# Patient Record
Sex: FEMALE | Race: BLACK OR AFRICAN AMERICAN | NOT HISPANIC OR LATINO | Employment: UNEMPLOYED | URBAN - METROPOLITAN AREA
[De-identification: names, ages, dates, MRNs, and addresses within clinical notes are randomized per-mention and may not be internally consistent; named-entity substitution may affect disease eponyms.]

---

## 2023-04-26 ENCOUNTER — TELEPHONE (OUTPATIENT)
Dept: PRIMARY CARE | Facility: CLINIC | Age: 53
End: 2023-04-26
Payer: COMMERCIAL

## 2023-04-26 DIAGNOSIS — R06.2 WHEEZING: Primary | ICD-10-CM

## 2023-04-26 PROBLEM — J40 BRONCHITIS: Status: ACTIVE | Noted: 2023-04-26

## 2023-04-26 PROBLEM — E66.9 OBESITY: Status: ACTIVE | Noted: 2023-04-26

## 2023-04-26 PROBLEM — J45.909 ASTHMA (HHS-HCC): Status: ACTIVE | Noted: 2023-04-26

## 2023-04-26 PROBLEM — J45.901 ASTHMA WITH ACUTE EXACERBATION (HHS-HCC): Status: ACTIVE | Noted: 2023-04-26

## 2023-04-26 PROBLEM — R05.9 COUGH: Status: ACTIVE | Noted: 2023-04-26

## 2023-04-26 PROBLEM — T38.0X5A STEROID-INDUCED MYOPATHY: Status: ACTIVE | Noted: 2023-04-26

## 2023-04-26 PROBLEM — U07.1 DISEASE DUE TO SEVERE ACUTE RESPIRATORY SYNDROME CORONAVIRUS 2 (SARS-COV-2): Status: ACTIVE | Noted: 2023-04-26

## 2023-04-26 PROBLEM — R00.0 TACHYCARDIA: Status: ACTIVE | Noted: 2023-04-26

## 2023-04-26 PROBLEM — J30.1 HAY FEVER: Status: ACTIVE | Noted: 2023-04-26

## 2023-04-26 PROBLEM — R53.83 FATIGUE: Status: ACTIVE | Noted: 2023-04-26

## 2023-04-26 PROBLEM — I10 HYPERTENSION: Status: ACTIVE | Noted: 2023-04-26

## 2023-04-26 PROBLEM — M25.529 ELBOW PAIN: Status: ACTIVE | Noted: 2023-04-26

## 2023-04-26 PROBLEM — R68.89 CUSHINGOID FACIES: Status: ACTIVE | Noted: 2023-04-26

## 2023-04-26 PROBLEM — J44.1 COPD EXACERBATION (MULTI): Status: ACTIVE | Noted: 2023-04-26

## 2023-04-26 PROBLEM — R73.01 IMPAIRED FASTING GLUCOSE: Status: ACTIVE | Noted: 2023-04-26

## 2023-04-26 PROBLEM — J20.9 ACUTE BRONCHITIS: Status: ACTIVE | Noted: 2023-04-26

## 2023-04-26 PROBLEM — K21.9 ESOPHAGEAL REFLUX: Status: ACTIVE | Noted: 2023-04-26

## 2023-04-26 PROBLEM — F41.9 ANXIETY: Status: ACTIVE | Noted: 2023-04-26

## 2023-04-26 PROBLEM — J30.9 ALLERGIC RHINITIS: Status: ACTIVE | Noted: 2023-04-26

## 2023-04-26 PROBLEM — E78.5 HYPERLIPIDEMIA: Status: ACTIVE | Noted: 2023-04-26

## 2023-04-26 PROBLEM — R06.02 SOB (SHORTNESS OF BREATH): Status: ACTIVE | Noted: 2023-04-26

## 2023-04-26 PROBLEM — R25.2 LEG CRAMPS: Status: ACTIVE | Noted: 2023-04-26

## 2023-04-26 PROBLEM — R60.9 EDEMA: Status: ACTIVE | Noted: 2023-04-26

## 2023-04-26 PROBLEM — J44.9 COPD (CHRONIC OBSTRUCTIVE PULMONARY DISEASE) (MULTI): Status: ACTIVE | Noted: 2023-04-26

## 2023-04-26 PROBLEM — N80.9 ENDOMETRIOSIS: Status: ACTIVE | Noted: 2023-04-26

## 2023-04-26 PROBLEM — G47.33 OBSTRUCTIVE SLEEP APNEA: Status: ACTIVE | Noted: 2023-04-26

## 2023-04-26 PROBLEM — E55.9 VITAMIN D DEFICIENCY: Status: ACTIVE | Noted: 2023-04-26

## 2023-04-26 PROBLEM — F43.0 ACUTE REACTION TO STRESS: Status: ACTIVE | Noted: 2023-04-26

## 2023-04-26 PROBLEM — B34.2 CORONAVIRUS INFECTION: Status: ACTIVE | Noted: 2023-04-26

## 2023-04-26 PROBLEM — B94.8 POST-VIRAL DISORDER: Status: ACTIVE | Noted: 2023-04-26

## 2023-04-26 PROBLEM — M54.32 SCIATICA OF LEFT SIDE: Status: ACTIVE | Noted: 2023-04-26

## 2023-04-26 PROBLEM — M75.50 SUBACROMIAL BURSITIS: Status: ACTIVE | Noted: 2023-04-26

## 2023-04-26 PROBLEM — J06.9 UPPER RESPIRATORY INFECTION: Status: ACTIVE | Noted: 2023-04-26

## 2023-04-26 PROBLEM — R87.610 PAP SMEAR ABNORMALITY OF CERVIX WITH ASCUS FAVORING BENIGN: Status: ACTIVE | Noted: 2023-04-26

## 2023-04-26 PROBLEM — I27.20 PULMONARY HYPERTENSION (MULTI): Status: ACTIVE | Noted: 2023-04-26

## 2023-04-26 PROBLEM — J30.2 SEASONAL ALLERGIES: Status: ACTIVE | Noted: 2023-04-26

## 2023-04-26 PROBLEM — E66.01 MORBID OBESITY (MULTI): Status: ACTIVE | Noted: 2023-04-26

## 2023-04-26 PROBLEM — G72.0 STEROID-INDUCED MYOPATHY: Status: ACTIVE | Noted: 2023-04-26

## 2023-04-26 PROBLEM — M25.511 PAIN IN JOINT OF RIGHT SHOULDER: Status: ACTIVE | Noted: 2023-04-26

## 2023-04-26 PROBLEM — R63.2 POLYPHAGIA: Status: ACTIVE | Noted: 2023-04-26

## 2023-04-26 PROBLEM — M70.62 TROCHANTERIC BURSITIS OF LEFT HIP: Status: ACTIVE | Noted: 2023-04-26

## 2023-04-26 PROBLEM — R94.31 ABNORMAL ELECTROCARDIOGRAM: Status: ACTIVE | Noted: 2023-04-26

## 2023-04-26 PROBLEM — R09.82 POST-NASAL DRIP: Status: ACTIVE | Noted: 2023-04-26

## 2023-04-26 PROBLEM — R10.2 FEMALE PELVIC PAIN: Status: ACTIVE | Noted: 2023-04-26

## 2023-04-26 PROBLEM — I50.9 CONGESTIVE HEART FAILURE (CHF) (MULTI): Status: ACTIVE | Noted: 2023-04-26

## 2023-04-26 PROBLEM — L73.2 HIDRADENITIS: Status: ACTIVE | Noted: 2023-04-26

## 2023-04-26 PROBLEM — R07.9 CHEST PAIN: Status: ACTIVE | Noted: 2023-04-26

## 2023-04-26 PROBLEM — R68.89 COLD INTOLERANCE: Status: ACTIVE | Noted: 2023-04-26

## 2023-04-26 PROBLEM — E11.9 DIABETES MELLITUS, TYPE 2 (MULTI): Status: ACTIVE | Noted: 2023-04-26

## 2023-04-26 PROBLEM — R05.3 CHRONIC COUGH: Status: ACTIVE | Noted: 2023-04-26

## 2023-04-26 PROBLEM — M17.12 ARTHRITIS OF KNEE, LEFT: Status: ACTIVE | Noted: 2023-04-26

## 2023-04-26 PROBLEM — M16.12 ARTHRITIS OF LEFT HIP: Status: ACTIVE | Noted: 2023-04-26

## 2023-04-26 RX ORDER — ALBUTEROL SULFATE 90 UG/1
AEROSOL, METERED RESPIRATORY (INHALATION)
Qty: 18 G | Refills: 0 | Status: SHIPPED | OUTPATIENT
Start: 2023-04-26 | End: 2023-07-10 | Stop reason: SDUPTHER

## 2023-04-26 RX ORDER — ALBUTEROL SULFATE 90 UG/1
AEROSOL, METERED RESPIRATORY (INHALATION)
COMMUNITY
Start: 2018-02-28 | End: 2023-04-26 | Stop reason: SDUPTHER

## 2023-04-26 NOTE — TELEPHONE ENCOUNTER
Pt called in and stated that she has an established protocol for her asthma and is all out of her meds.        RX: Albuterol Inhaler    RX: Prednisone 10 mg      RX: Medrol pack      RX: Z-PACK            [facility]            CAN YOU PLEASE PEND AND SEND

## 2023-05-04 DIAGNOSIS — E11.9 TYPE 2 DIABETES MELLITUS WITHOUT COMPLICATION, UNSPECIFIED WHETHER LONG TERM INSULIN USE (MULTI): ICD-10-CM

## 2023-05-04 RX ORDER — TIRZEPATIDE 12.5 MG/.5ML
INJECTION, SOLUTION SUBCUTANEOUS
COMMUNITY
Start: 2022-11-07 | End: 2023-05-04 | Stop reason: SDUPTHER

## 2023-05-05 RX ORDER — TIRZEPATIDE 12.5 MG/.5ML
12.5 INJECTION, SOLUTION SUBCUTANEOUS
Qty: 2 ML | Refills: 0 | Status: SHIPPED | OUTPATIENT
Start: 2023-05-05 | End: 2023-06-08 | Stop reason: SDUPTHER

## 2023-05-17 DIAGNOSIS — J40 BRONCHITIS: ICD-10-CM

## 2023-05-17 RX ORDER — MONTELUKAST SODIUM 10 MG/1
1 TABLET ORAL NIGHTLY
COMMUNITY
Start: 2018-09-25 | End: 2023-05-17 | Stop reason: SDUPTHER

## 2023-05-18 RX ORDER — MONTELUKAST SODIUM 10 MG/1
10 TABLET ORAL NIGHTLY
Qty: 30 TABLET | Refills: 0 | Status: SHIPPED | OUTPATIENT
Start: 2023-05-18 | End: 2023-06-12

## 2023-06-06 ENCOUNTER — TELEPHONE (OUTPATIENT)
Dept: PRIMARY CARE | Facility: CLINIC | Age: 53
End: 2023-06-06
Payer: COMMERCIAL

## 2023-06-06 NOTE — TELEPHONE ENCOUNTER
PT called in and would like to est care with Dr. Mcknight since Dr. Martinez is not taking new patients. PT needs a RF until she can get an APPT with new PCP      RX: Tirzepatide 12.5 mg/0.5 ml        [facility]                      CAN YOU PEND AND SEND AND SEE IF [facility] CAN DO TEMPORARY SUPPLY

## 2023-06-08 DIAGNOSIS — E11.9 TYPE 2 DIABETES MELLITUS WITHOUT COMPLICATION, UNSPECIFIED WHETHER LONG TERM INSULIN USE (MULTI): ICD-10-CM

## 2023-06-08 RX ORDER — TIRZEPATIDE 12.5 MG/.5ML
12.5 INJECTION, SOLUTION SUBCUTANEOUS
Qty: 2 ML | Refills: 1 | Status: SHIPPED | OUTPATIENT
Start: 2023-06-08 | End: 2023-07-10 | Stop reason: SDUPTHER

## 2023-06-09 ENCOUNTER — TELEPHONE (OUTPATIENT)
Dept: PRIMARY CARE | Facility: CLINIC | Age: 53
End: 2023-06-09
Payer: COMMERCIAL

## 2023-06-09 DIAGNOSIS — I10 HYPERTENSION, UNSPECIFIED TYPE: ICD-10-CM

## 2023-06-09 NOTE — TELEPHONE ENCOUNTER
Patient called and stated the eLtty RMB sent to pharmacy yesterday is out of stock she needs a new dosage sent to [facility] pharmacy of either the higher dose or the lower, which she said is 10MG    Please call and advise patient when sent to pharmacy she would like it done asap  [phone]

## 2023-06-10 DIAGNOSIS — J40 BRONCHITIS: ICD-10-CM

## 2023-06-10 DIAGNOSIS — E11.9 TYPE 2 DIABETES MELLITUS WITHOUT COMPLICATION, UNSPECIFIED WHETHER LONG TERM INSULIN USE (MULTI): ICD-10-CM

## 2023-06-12 DIAGNOSIS — E11.69 TYPE 2 DIABETES MELLITUS WITH OTHER SPECIFIED COMPLICATION, UNSPECIFIED WHETHER LONG TERM INSULIN USE (MULTI): ICD-10-CM

## 2023-06-12 RX ORDER — FUROSEMIDE 20 MG/1
20 TABLET ORAL 2 TIMES DAILY
Qty: 60 TABLET | Refills: 0 | Status: CANCELLED | OUTPATIENT
Start: 2023-06-12

## 2023-06-12 RX ORDER — MONTELUKAST SODIUM 10 MG/1
10 TABLET ORAL NIGHTLY
Qty: 30 TABLET | Refills: 0 | Status: SHIPPED | OUTPATIENT
Start: 2023-06-12 | End: 2023-07-10 | Stop reason: SDUPTHER

## 2023-06-12 RX ORDER — POTASSIUM CHLORIDE 1500 MG/1
20 TABLET, EXTENDED RELEASE ORAL DAILY
Qty: 30 TABLET | Refills: 0 | Status: SHIPPED | OUTPATIENT
Start: 2023-06-12 | End: 2023-08-23 | Stop reason: SDUPTHER

## 2023-06-12 RX ORDER — FUROSEMIDE 20 MG/1
20 TABLET ORAL 2 TIMES DAILY
Qty: 60 TABLET | Refills: 0 | Status: SHIPPED | OUTPATIENT
Start: 2023-06-12

## 2023-06-12 RX ORDER — TIRZEPATIDE 7.5 MG/.5ML
7.5 INJECTION, SOLUTION SUBCUTANEOUS
Qty: 4 ML | Refills: 2 | Status: SHIPPED | OUTPATIENT
Start: 2023-06-12 | End: 2023-06-13 | Stop reason: DRUGHIGH

## 2023-06-12 RX ORDER — POTASSIUM CHLORIDE 1500 MG/1
20 TABLET, EXTENDED RELEASE ORAL DAILY
COMMUNITY
Start: 2014-07-05 | End: 2023-06-12 | Stop reason: SDUPTHER

## 2023-06-12 RX ORDER — FUROSEMIDE 20 MG/1
20 TABLET ORAL 2 TIMES DAILY
COMMUNITY
End: 2023-06-12 | Stop reason: SDUPTHER

## 2023-06-12 RX ORDER — POTASSIUM CHLORIDE 1500 MG/1
20 TABLET, EXTENDED RELEASE ORAL DAILY
Qty: 30 TABLET | Refills: 0 | Status: CANCELLED | OUTPATIENT
Start: 2023-06-12

## 2023-06-12 NOTE — TELEPHONE ENCOUNTER
Called patient and she informed me that for the mounjaro she is taking 12.5 and since the pharmacy is out of the 12.5 she would like to take the 10. Patient also stated that she would like the refills sent over to [facility] pharmacy.      Patient was identified with full name and date of birth.       Lyubov Upton MA

## 2023-06-13 RX ORDER — TIRZEPATIDE 15 MG/.5ML
15 INJECTION, SOLUTION SUBCUTANEOUS
Qty: 4 ML | Refills: 0 | Status: SHIPPED | OUTPATIENT
Start: 2023-06-13 | End: 2023-07-13

## 2023-06-13 NOTE — TELEPHONE ENCOUNTER
PT called in and stated that the wrong dosage for her RX Mounjaro was sent to pharmacy by Dr. Ackerman. PT states that the dosage should either be a 10 or 15 mg NOT 7.5 mg that would be going down for the PT. She stated she is going into 72 hrs without medication can please send in correct dose. PT states she has been trying to get corrected since last week

## 2023-06-21 ENCOUNTER — TELEPHONE (OUTPATIENT)
Dept: PRIMARY CARE | Facility: CLINIC | Age: 53
End: 2023-06-21

## 2023-06-23 ENCOUNTER — TELEPHONE (OUTPATIENT)
Dept: PRIMARY CARE | Facility: CLINIC | Age: 53
End: 2023-06-23
Payer: COMMERCIAL

## 2023-07-10 ENCOUNTER — OFFICE VISIT (OUTPATIENT)
Dept: PRIMARY CARE | Facility: CLINIC | Age: 53
End: 2023-07-10
Payer: COMMERCIAL

## 2023-07-10 ENCOUNTER — LAB (OUTPATIENT)
Dept: LAB | Facility: LAB | Age: 53
End: 2023-07-10
Payer: COMMERCIAL

## 2023-07-10 VITALS
SYSTOLIC BLOOD PRESSURE: 118 MMHG | OXYGEN SATURATION: 96 % | HEIGHT: 66 IN | DIASTOLIC BLOOD PRESSURE: 74 MMHG | WEIGHT: 288.8 LBS | RESPIRATION RATE: 20 BRPM | BODY MASS INDEX: 46.41 KG/M2 | HEART RATE: 94 BPM

## 2023-07-10 DIAGNOSIS — I10 PRIMARY HYPERTENSION: Primary | ICD-10-CM

## 2023-07-10 DIAGNOSIS — E78.2 MIXED HYPERLIPIDEMIA: ICD-10-CM

## 2023-07-10 DIAGNOSIS — Z12.31 VISIT FOR SCREENING MAMMOGRAM: ICD-10-CM

## 2023-07-10 DIAGNOSIS — Z71.82 EXERCISE COUNSELING: ICD-10-CM

## 2023-07-10 DIAGNOSIS — E11.9 TYPE 2 DIABETES MELLITUS WITHOUT COMPLICATION, WITHOUT LONG-TERM CURRENT USE OF INSULIN (MULTI): ICD-10-CM

## 2023-07-10 DIAGNOSIS — E66.01 MORBID OBESITY (MULTI): ICD-10-CM

## 2023-07-10 DIAGNOSIS — I10 PRIMARY HYPERTENSION: ICD-10-CM

## 2023-07-10 DIAGNOSIS — E11.9 TYPE 2 DIABETES MELLITUS WITHOUT COMPLICATION, UNSPECIFIED WHETHER LONG TERM INSULIN USE (MULTI): ICD-10-CM

## 2023-07-10 DIAGNOSIS — J40 BRONCHITIS: ICD-10-CM

## 2023-07-10 DIAGNOSIS — J45.20 MILD INTERMITTENT ASTHMA WITHOUT COMPLICATION (HHS-HCC): ICD-10-CM

## 2023-07-10 DIAGNOSIS — R06.2 WHEEZING: ICD-10-CM

## 2023-07-10 PROBLEM — J44.1 COPD EXACERBATION (MULTI): Status: RESOLVED | Noted: 2023-04-26 | Resolved: 2023-07-10

## 2023-07-10 PROBLEM — J44.9 COPD (CHRONIC OBSTRUCTIVE PULMONARY DISEASE) (MULTI): Status: RESOLVED | Noted: 2023-04-26 | Resolved: 2023-07-10

## 2023-07-10 PROBLEM — I50.9 CONGESTIVE HEART FAILURE (CHF) (MULTI): Status: RESOLVED | Noted: 2023-04-26 | Resolved: 2023-07-10

## 2023-07-10 PROBLEM — I27.20 PULMONARY HYPERTENSION (MULTI): Status: RESOLVED | Noted: 2023-04-26 | Resolved: 2023-07-10

## 2023-07-10 LAB
ALBUMIN (MG/L) IN URINE: 10.3 MG/L
ALBUMIN/CREATININE (UG/MG) IN URINE: 6 UG/MG CRT (ref 0–30)
ANION GAP IN SER/PLAS: 13 MMOL/L (ref 10–20)
CALCIUM (MG/DL) IN SER/PLAS: 9.2 MG/DL (ref 8.6–10.3)
CARBON DIOXIDE, TOTAL (MMOL/L) IN SER/PLAS: 24 MMOL/L (ref 21–32)
CHLORIDE (MMOL/L) IN SER/PLAS: 106 MMOL/L (ref 98–107)
CHOLESTEROL (MG/DL) IN SER/PLAS: 212 MG/DL (ref 0–199)
CHOLESTEROL IN HDL (MG/DL) IN SER/PLAS: 52.8 MG/DL
CHOLESTEROL/HDL RATIO: 4
CREATININE (MG/DL) IN SER/PLAS: 0.53 MG/DL (ref 0.5–1.05)
CREATININE (MG/DL) IN URINE: 173 MG/DL (ref 20–320)
ESTIMATED AVERAGE GLUCOSE FOR HBA1C: 111 MG/DL
GFR FEMALE: >90 ML/MIN/1.73M2
GLUCOSE (MG/DL) IN SER/PLAS: 78 MG/DL (ref 74–99)
HEMOGLOBIN A1C/HEMOGLOBIN TOTAL IN BLOOD: 5.5 %
LDL: 140 MG/DL (ref 0–99)
POTASSIUM (MMOL/L) IN SER/PLAS: 4.3 MMOL/L (ref 3.5–5.3)
SODIUM (MMOL/L) IN SER/PLAS: 139 MMOL/L (ref 136–145)
TRIGLYCERIDE (MG/DL) IN SER/PLAS: 97 MG/DL (ref 0–149)
UREA NITROGEN (MG/DL) IN SER/PLAS: 13 MG/DL (ref 6–23)
VLDL: 19 MG/DL (ref 0–40)

## 2023-07-10 PROCEDURE — 82043 UR ALBUMIN QUANTITATIVE: CPT

## 2023-07-10 PROCEDURE — 3074F SYST BP LT 130 MM HG: CPT | Performed by: STUDENT IN AN ORGANIZED HEALTH CARE EDUCATION/TRAINING PROGRAM

## 2023-07-10 PROCEDURE — 3008F BODY MASS INDEX DOCD: CPT | Performed by: STUDENT IN AN ORGANIZED HEALTH CARE EDUCATION/TRAINING PROGRAM

## 2023-07-10 PROCEDURE — 3078F DIAST BP <80 MM HG: CPT | Performed by: STUDENT IN AN ORGANIZED HEALTH CARE EDUCATION/TRAINING PROGRAM

## 2023-07-10 PROCEDURE — 83036 HEMOGLOBIN GLYCOSYLATED A1C: CPT

## 2023-07-10 PROCEDURE — 36415 COLL VENOUS BLD VENIPUNCTURE: CPT

## 2023-07-10 PROCEDURE — 99214 OFFICE O/P EST MOD 30 MIN: CPT | Performed by: STUDENT IN AN ORGANIZED HEALTH CARE EDUCATION/TRAINING PROGRAM

## 2023-07-10 PROCEDURE — 82570 ASSAY OF URINE CREATININE: CPT

## 2023-07-10 PROCEDURE — 80048 BASIC METABOLIC PNL TOTAL CA: CPT

## 2023-07-10 PROCEDURE — 80061 LIPID PANEL: CPT

## 2023-07-10 PROCEDURE — 1036F TOBACCO NON-USER: CPT | Performed by: STUDENT IN AN ORGANIZED HEALTH CARE EDUCATION/TRAINING PROGRAM

## 2023-07-10 PROCEDURE — 4010F ACE/ARB THERAPY RXD/TAKEN: CPT | Performed by: STUDENT IN AN ORGANIZED HEALTH CARE EDUCATION/TRAINING PROGRAM

## 2023-07-10 RX ORDER — MONTELUKAST SODIUM 10 MG/1
10 TABLET ORAL NIGHTLY
Qty: 90 TABLET | Refills: 3 | Status: SHIPPED | OUTPATIENT
Start: 2023-07-10

## 2023-07-10 RX ORDER — LOSARTAN POTASSIUM 100 MG/1
100 TABLET ORAL DAILY
COMMUNITY
End: 2023-10-16 | Stop reason: SDUPTHER

## 2023-07-10 RX ORDER — ALBUTEROL SULFATE 90 UG/1
AEROSOL, METERED RESPIRATORY (INHALATION)
Qty: 18 G | Refills: 3 | Status: SHIPPED | OUTPATIENT
Start: 2023-07-10

## 2023-07-10 RX ORDER — TIRZEPATIDE 12.5 MG/.5ML
12.5 INJECTION, SOLUTION SUBCUTANEOUS
Qty: 6 ML | Refills: 1 | Status: SHIPPED | OUTPATIENT
Start: 2023-07-10 | End: 2023-09-18 | Stop reason: SDUPTHER

## 2023-07-10 NOTE — PROGRESS NOTES
"Subjective   Patient ID: Smitha Frank is a 53 y.o. female who presents for Back Pain (Lower back pain. Having back spasms.).        HPI    Est care   Pt's PMH, PSH, SH, FH , meds and allergies was obtained / reviewed and updated .     HTN   At goal      Prediabetes / Diabetes     Asthma +/- COPD   Former smoker , quit in 2010     Pulm HTN  Est with Pulm     HPL   Not on statin     Obesity   On Mounjaro  since Dec   Lost 16 lbs since Dec   No regular exercise regiment other than movement with ADLs        Visit Vitals  /74   Pulse 94   Resp 20   Ht 1.664 m (5' 5.5\")   Wt 131 kg (288 lb 12.8 oz)   SpO2 96%   BMI 47.33 kg/m²   Smoking Status Former   BSA 2.46 m²      No LMP recorded.     Review of Systems    Constitutional : No feeling poorly / fevers/ chills / night sweats/ fatigue   Cardiovascular : No CP /Palpitations/ lower extremity edema / syncope   Respiratory : No Cough /SIDHU/Dyspnea at rest   Gastrointestinal : No abd pain / N/V  No bloody stools/ melena / constipation  Endo : No polyuria/polydipsia/ muscle weakness / sluggishness   CNS: No confusion / HA/ tingling/ numbness/ weakness of extremities  Psychiatric: No anxiety/ depression/ SI/HI    All other systems have been reviewed and are negative for complaint       Physical Exam    Constitutional : Vitals reviewed. Alert and in no distress  Cardiovascular : RRR, Normal S1, S2, No pericardial rub/ gallop, no peripheral edema   Pulmonary: No respiratory distress, CTAB   MSK : Normal gait and station , strength and tone   Skin: Normal skin color and pigmentation, normal skin turgor and no rash   Neurologic : CNs 2-12 grossly intact , no obvious FNDs  Psych : A,Ox3, normal mood and affect      Assessment/Plan   Diagnoses and all orders for this visit:  Primary hypertension  -     Basic Metabolic Panel; Future  Type 2 diabetes mellitus without complication, without long-term current use of insulin (CMS/[facility])  -     Albumin , Urine Random; Future  -     " Hemoglobin A1C; Future  Mixed hyperlipidemia  -     Lipid Panel; Future  Morbid obesity (CMS/[facility])  -     Referral to Nutrition Services; Future  Visit for screening mammogram  -     BI mammo bilateral screening tomosynthesis; Future  Body mass index (BMI) of 45.0 to 49.9 in adult (CMS/[facility])  -     Referral to Nutrition Services; Future  -     tirzepatide (Mounjaro) 12.5 mg/0.5 mL pen injector; Inject 12.5 mg under the skin every 7 days.  Type 2 diabetes mellitus without complication, unspecified whether long term insulin use (CMS/[facility])  -     tirzepatide (Mounjaro) 12.5 mg/0.5 mL pen injector; Inject 12.5 mg under the skin every 7 days.  Bronchitis  -     montelukast (Singulair) 10 mg tablet; Take 1 tablet (10 mg) by mouth once daily at bedtime.    53-year-old female with hypertension, obesity, history of diabetes, hyperlipidemia, mild intermittent asthma, former smoker (quit in 2010) presents to establish care.       Conditions addressed and mgmt as noted above.  Pertinent labs, images/ imaging reports , chart review was done .   Age appropriate labs / labs for mgmt of chronic medical conditions ordered, further mgmt pending the results.       I did inform patient that if she experiences asthma exacerbation, I will be glad to see her in the office rather than giving her antibiotics, prednisone burst ahead of her symptoms ( to the pharmacy )    RTO in 3m for CPE

## 2023-07-18 ENCOUNTER — TELEPHONE (OUTPATIENT)
Dept: PRIMARY CARE | Facility: CLINIC | Age: 53
End: 2023-07-18
Payer: COMMERCIAL

## 2023-07-18 DIAGNOSIS — R92.8 ABNORMAL MAMMOGRAM OF RIGHT BREAST: Primary | ICD-10-CM

## 2023-07-18 NOTE — TELEPHONE ENCOUNTER
----- Message from Kirsten Ayers MD sent at 7/18/2023  4:00 PM EDT -----  Right breast needs additional imaging , ordered   [phone] to schedule   No cancer seen in the left breast

## 2023-08-02 ENCOUNTER — TELEPHONE (OUTPATIENT)
Dept: PRIMARY CARE | Facility: CLINIC | Age: 53
End: 2023-08-02
Payer: COMMERCIAL

## 2023-08-02 NOTE — TELEPHONE ENCOUNTER
----- Message from Kirsten Ayers MD sent at 8/2/2023  2:33 PM EDT -----  No worrisome findings were seen in the right breast on rpt imaging

## 2023-08-23 DIAGNOSIS — I10 HYPERTENSION, UNSPECIFIED TYPE: ICD-10-CM

## 2023-08-23 RX ORDER — POTASSIUM CHLORIDE 1500 MG/1
20 TABLET, EXTENDED RELEASE ORAL DAILY
Qty: 90 TABLET | Refills: 3 | Status: SHIPPED | OUTPATIENT
Start: 2023-08-23

## 2023-08-27 RX ORDER — BLOOD SUGAR DIAGNOSTIC
STRIP MISCELLANEOUS
COMMUNITY
Start: 2021-01-21

## 2023-08-27 RX ORDER — PANTOPRAZOLE SODIUM 40 MG/1
40 TABLET, DELAYED RELEASE ORAL
COMMUNITY
Start: 2014-07-25 | End: 2023-10-16

## 2023-08-27 RX ORDER — FLUTICASONE PROPIONATE 50 MCG
4 SPRAY, SUSPENSION (ML) NASAL 3 TIMES DAILY
COMMUNITY
Start: 2017-07-14

## 2023-08-27 RX ORDER — IPRATROPIUM BROMIDE AND ALBUTEROL SULFATE 2.5; .5 MG/3ML; MG/3ML
3 SOLUTION RESPIRATORY (INHALATION) EVERY 6 HOURS
COMMUNITY

## 2023-08-27 RX ORDER — SEMAGLUTIDE 1.34 MG/ML
1 INJECTION, SOLUTION SUBCUTANEOUS
COMMUNITY
Start: 2022-02-03 | End: 2023-09-18 | Stop reason: ALTCHOICE

## 2023-08-27 RX ORDER — CHOLECALCIFEROL (VITAMIN D3) 25 MCG
5000 TABLET ORAL
COMMUNITY
End: 2023-09-18

## 2023-08-27 RX ORDER — FLUTICASONE PROPIONATE 50 MCG
1 SPRAY, SUSPENSION (ML) NASAL DAILY
COMMUNITY
End: 2023-09-18

## 2023-08-27 RX ORDER — METFORMIN HYDROCHLORIDE 500 MG/1
1000 TABLET ORAL
COMMUNITY
Start: 2018-02-16 | End: 2023-09-18 | Stop reason: ALTCHOICE

## 2023-08-27 RX ORDER — DEXTROMETHORPHAN HBR. AND GUAIFENESIN 10; 100 MG/5ML; MG/5ML
5 SOLUTION ORAL EVERY 4 HOURS PRN
COMMUNITY
Start: 2021-01-18 | End: 2023-09-18 | Stop reason: ALTCHOICE

## 2023-08-27 RX ORDER — TIRZEPATIDE 5 MG/.5ML
INJECTION, SOLUTION SUBCUTANEOUS
COMMUNITY
Start: 2022-11-07 | End: 2023-09-18

## 2023-08-27 RX ORDER — MAGNESIUM GLUCONATE 27.5 (500)
200 TABLET ORAL 2 TIMES DAILY
COMMUNITY

## 2023-08-27 RX ORDER — FLUTICASONE FUROATE AND VILANTEROL 200; 25 UG/1; UG/1
1 POWDER RESPIRATORY (INHALATION) DAILY
COMMUNITY
Start: 2018-02-28 | End: 2023-10-16 | Stop reason: SDUPTHER

## 2023-08-27 RX ORDER — CALCIUM CARBONATE 300MG(750)
1 TABLET,CHEWABLE ORAL DAILY
COMMUNITY
End: 2023-10-16

## 2023-08-27 RX ORDER — GABAPENTIN 100 MG/1
1-2 CAPSULE ORAL 3 TIMES DAILY PRN
COMMUNITY
Start: 2020-01-29

## 2023-08-27 RX ORDER — THEOPHYLLINE 200 MG/1
200 TABLET, EXTENDED RELEASE ORAL 3 TIMES DAILY
COMMUNITY
End: 2023-09-18 | Stop reason: ALTCHOICE

## 2023-08-27 RX ORDER — ASPIRIN 325 MG
50000 TABLET, DELAYED RELEASE (ENTERIC COATED) ORAL
COMMUNITY

## 2023-08-27 RX ORDER — FLUTICASONE PROPIONATE AND SALMETEROL 500; 50 UG/1; UG/1
1 POWDER RESPIRATORY (INHALATION) 2 TIMES DAILY
COMMUNITY
End: 2023-09-18

## 2023-08-27 RX ORDER — FLUTICASONE PROPIONATE 220 UG/1
2 AEROSOL, METERED RESPIRATORY (INHALATION) 2 TIMES DAILY
COMMUNITY
Start: 2017-10-29 | End: 2023-09-18 | Stop reason: ALTCHOICE

## 2023-08-27 RX ORDER — TIOTROPIUM BROMIDE 18 UG/1
18 CAPSULE ORAL; RESPIRATORY (INHALATION)
COMMUNITY
End: 2023-09-18

## 2023-08-27 RX ORDER — DEXTROMETHORPHAN HBR AND GUAIFENESIN 5; 100 MG/5ML; MG/5ML
5-10 LIQUID ORAL EVERY 4 HOURS PRN
COMMUNITY
Start: 2021-01-06 | End: 2023-09-18 | Stop reason: ALTCHOICE

## 2023-08-27 RX ORDER — NAPROXEN 500 MG/1
500 TABLET ORAL EVERY 12 HOURS
COMMUNITY
Start: 2020-01-29

## 2023-08-27 RX ORDER — FLUTICASONE PROPIONATE AND SALMETEROL 500; 50 UG/1; UG/1
1 POWDER RESPIRATORY (INHALATION) EVERY 12 HOURS
COMMUNITY
End: 2023-09-18 | Stop reason: ALTCHOICE

## 2023-08-27 RX ORDER — DIPHENHYDRAMINE HCL 50 MG
50 CAPSULE ORAL DAILY PRN
COMMUNITY

## 2023-08-27 RX ORDER — PRAVASTATIN SODIUM 10 MG/1
1 TABLET ORAL NIGHTLY
COMMUNITY
Start: 2019-11-07 | End: 2023-09-18

## 2023-08-27 RX ORDER — FLUTICASONE FUROATE AND VILANTEROL 100; 25 UG/1; UG/1
1 POWDER RESPIRATORY (INHALATION) DAILY
COMMUNITY

## 2023-08-27 RX ORDER — GUAIFENESIN 600 MG/1
600 TABLET, EXTENDED RELEASE ORAL 2 TIMES DAILY
COMMUNITY
End: 2023-10-16 | Stop reason: ALTCHOICE

## 2023-08-27 RX ORDER — ALBUTEROL SULFATE 0.63 MG/3ML
0.63 SOLUTION RESPIRATORY (INHALATION)
COMMUNITY
End: 2023-09-18 | Stop reason: ALTCHOICE

## 2023-08-27 RX ORDER — BUDESONIDE 1 MG/2ML
1 INHALANT ORAL
COMMUNITY
Start: 2018-12-07 | End: 2023-10-16 | Stop reason: SDUPTHER

## 2023-08-27 RX ORDER — MONTELUKAST SODIUM 5 MG/1
5 TABLET, CHEWABLE ORAL EVERY EVENING
COMMUNITY
End: 2023-09-18

## 2023-08-27 RX ORDER — DOXYCYCLINE 100 MG/1
100 CAPSULE ORAL 2 TIMES DAILY
COMMUNITY
Start: 2022-01-05 | End: 2023-09-18 | Stop reason: ALTCHOICE

## 2023-08-27 RX ORDER — ALBUTEROL SULFATE 0.83 MG/ML
SOLUTION RESPIRATORY (INHALATION)
COMMUNITY
Start: 2014-02-12 | End: 2023-09-18 | Stop reason: SDUPTHER

## 2023-08-27 RX ORDER — FUROSEMIDE 40 MG/1
40 TABLET ORAL 2 TIMES DAILY
COMMUNITY
Start: 2014-07-06 | End: 2023-10-16

## 2023-08-27 RX ORDER — UBIDECARENONE 75 MG
500 CAPSULE ORAL DAILY
COMMUNITY
End: 2023-10-16

## 2023-08-27 RX ORDER — DESONIDE 0.5 MG/G
CREAM TOPICAL
COMMUNITY
Start: 2017-02-26

## 2023-08-27 RX ORDER — ASCORBIC ACID 500 MG
1 TABLET,CHEWABLE ORAL DAILY
COMMUNITY

## 2023-08-27 RX ORDER — ACETAMINOPHEN 500 MG
TABLET ORAL
COMMUNITY
End: 2023-10-16 | Stop reason: SDUPTHER

## 2023-08-27 RX ORDER — LATANOPROST 50 UG/ML
SOLUTION/ DROPS OPHTHALMIC
COMMUNITY
Start: 2018-09-24

## 2023-08-27 RX ORDER — METHYLPREDNISOLONE 4 MG/1
TABLET ORAL
COMMUNITY
Start: 2014-08-21 | End: 2023-09-18 | Stop reason: ALTCHOICE

## 2023-08-27 RX ORDER — GENTAMICIN SULFATE 1 MG/G
CREAM TOPICAL
COMMUNITY
Start: 2017-02-26

## 2023-08-27 RX ORDER — CODEINE PHOSPHATE AND GUAIFENESIN 10; 100 MG/5ML; MG/5ML
5 SOLUTION ORAL
COMMUNITY
Start: 2014-05-30 | End: 2023-09-18 | Stop reason: ALTCHOICE

## 2023-08-27 RX ORDER — HYDROCODONE BITARTRATE AND HOMATROPINE METHYLBROMIDE ORAL SOLUTION 5; 1.5 MG/5ML; MG/5ML
5 LIQUID ORAL EVERY 4 HOURS PRN
COMMUNITY
End: 2023-10-16 | Stop reason: ALTCHOICE

## 2023-08-27 RX ORDER — TIRZEPATIDE 15 MG/.5ML
INJECTION, SOLUTION SUBCUTANEOUS
COMMUNITY
Start: 2023-08-09 | End: 2023-09-18 | Stop reason: ALTCHOICE

## 2023-08-27 RX ORDER — IPRATROPIUM BROMIDE AND ALBUTEROL 20; 100 UG/1; UG/1
SPRAY, METERED RESPIRATORY (INHALATION) 4 TIMES DAILY
COMMUNITY
Start: 2016-11-16 | End: 2023-10-16

## 2023-08-27 RX ORDER — LOSARTAN POTASSIUM 50 MG/1
TABLET ORAL
COMMUNITY
Start: 2014-07-25 | End: 2023-09-18 | Stop reason: ALTCHOICE

## 2023-08-27 RX ORDER — ERGOCALCIFEROL 1.25 MG/1
50000 CAPSULE ORAL
COMMUNITY
Start: 2018-05-09 | End: 2023-10-16 | Stop reason: SDUPTHER

## 2023-08-27 RX ORDER — ACETAMINOPHEN 325 MG/1
650 TABLET ORAL EVERY 4 HOURS PRN
COMMUNITY
Start: 2021-01-18

## 2023-09-15 ENCOUNTER — TELEPHONE (OUTPATIENT)
Dept: PRIMARY CARE | Facility: CLINIC | Age: 53
End: 2023-09-15
Payer: COMMERCIAL

## 2023-09-18 ENCOUNTER — TELEPHONE (OUTPATIENT)
Dept: PRIMARY CARE | Facility: CLINIC | Age: 53
End: 2023-09-18

## 2023-09-18 ENCOUNTER — OFFICE VISIT (OUTPATIENT)
Dept: PRIMARY CARE | Facility: CLINIC | Age: 53
End: 2023-09-18
Payer: COMMERCIAL

## 2023-09-18 VITALS
HEART RATE: 88 BPM | BODY MASS INDEX: 45.32 KG/M2 | HEIGHT: 66 IN | WEIGHT: 282 LBS | SYSTOLIC BLOOD PRESSURE: 121 MMHG | DIASTOLIC BLOOD PRESSURE: 76 MMHG | OXYGEN SATURATION: 94 %

## 2023-09-18 DIAGNOSIS — E78.2 MIXED HYPERLIPIDEMIA: ICD-10-CM

## 2023-09-18 DIAGNOSIS — J45.41 MODERATE PERSISTENT ASTHMA WITH EXACERBATION (HHS-HCC): ICD-10-CM

## 2023-09-18 DIAGNOSIS — E11.9 TYPE 2 DIABETES MELLITUS WITHOUT COMPLICATION, UNSPECIFIED WHETHER LONG TERM INSULIN USE (MULTI): ICD-10-CM

## 2023-09-18 DIAGNOSIS — J45.20 MILD INTERMITTENT ASTHMA WITHOUT COMPLICATION (HHS-HCC): Primary | ICD-10-CM

## 2023-09-18 DIAGNOSIS — Z79.899 ENCOUNTER FOR MEDICATION REVIEW: ICD-10-CM

## 2023-09-18 PROCEDURE — 3078F DIAST BP <80 MM HG: CPT | Performed by: STUDENT IN AN ORGANIZED HEALTH CARE EDUCATION/TRAINING PROGRAM

## 2023-09-18 PROCEDURE — 1036F TOBACCO NON-USER: CPT | Performed by: STUDENT IN AN ORGANIZED HEALTH CARE EDUCATION/TRAINING PROGRAM

## 2023-09-18 PROCEDURE — 3074F SYST BP LT 130 MM HG: CPT | Performed by: STUDENT IN AN ORGANIZED HEALTH CARE EDUCATION/TRAINING PROGRAM

## 2023-09-18 PROCEDURE — 99215 OFFICE O/P EST HI 40 MIN: CPT | Performed by: STUDENT IN AN ORGANIZED HEALTH CARE EDUCATION/TRAINING PROGRAM

## 2023-09-18 PROCEDURE — 3044F HG A1C LEVEL LT 7.0%: CPT | Performed by: STUDENT IN AN ORGANIZED HEALTH CARE EDUCATION/TRAINING PROGRAM

## 2023-09-18 PROCEDURE — 3008F BODY MASS INDEX DOCD: CPT | Performed by: STUDENT IN AN ORGANIZED HEALTH CARE EDUCATION/TRAINING PROGRAM

## 2023-09-18 PROCEDURE — 4010F ACE/ARB THERAPY RXD/TAKEN: CPT | Performed by: STUDENT IN AN ORGANIZED HEALTH CARE EDUCATION/TRAINING PROGRAM

## 2023-09-18 RX ORDER — PREDNISONE 10 MG/1
TABLET ORAL
Qty: 15 TABLET | Refills: 0 | Status: SHIPPED | OUTPATIENT
Start: 2023-09-18 | End: 2023-10-16 | Stop reason: ALTCHOICE

## 2023-09-18 RX ORDER — AZITHROMYCIN 250 MG/1
TABLET, FILM COATED ORAL
Qty: 6 TABLET | Refills: 0 | Status: SHIPPED | OUTPATIENT
Start: 2023-09-18 | End: 2023-09-23

## 2023-09-18 RX ORDER — FLUTICASONE FUROATE AND VILANTEROL 100; 25 UG/1; UG/1
1 POWDER RESPIRATORY (INHALATION) DAILY
Qty: 60 EACH | Refills: 11 | Status: SHIPPED | OUTPATIENT
Start: 2023-09-18 | End: 2023-10-16 | Stop reason: SDUPTHER

## 2023-09-18 RX ORDER — TIRZEPATIDE 12.5 MG/.5ML
12.5 INJECTION, SOLUTION SUBCUTANEOUS
Qty: 6 ML | Refills: 1 | Status: SHIPPED | OUTPATIENT
Start: 2023-09-18

## 2023-09-18 RX ORDER — TIRZEPATIDE 12.5 MG/.5ML
12.5 INJECTION, SOLUTION SUBCUTANEOUS
Qty: 6 ML | Refills: 1 | Status: SHIPPED | OUTPATIENT
Start: 2023-09-18 | End: 2023-09-18 | Stop reason: SDUPTHER

## 2023-09-18 RX ORDER — ATORVASTATIN CALCIUM 20 MG/1
20 TABLET, FILM COATED ORAL DAILY
Qty: 90 TABLET | Refills: 3 | Status: SHIPPED | OUTPATIENT
Start: 2023-09-18 | End: 2024-09-17

## 2023-09-18 RX ORDER — ALBUTEROL SULFATE 0.83 MG/ML
SOLUTION RESPIRATORY (INHALATION)
Qty: 75 ML | Refills: 3 | Status: SHIPPED | OUTPATIENT
Start: 2023-09-18

## 2023-09-18 RX ORDER — PREDNISONE 10 MG/1
10 TABLET ORAL DAILY
Qty: 15 TABLET | Refills: 0 | Status: SHIPPED | OUTPATIENT
Start: 2023-09-18 | End: 2023-09-18 | Stop reason: SDUPTHER

## 2023-09-18 NOTE — TELEPHONE ENCOUNTER
PT called in stating that her RX for Mounjaro is supposed to go to the [facility] in [place]

## 2023-09-18 NOTE — TELEPHONE ENCOUNTER
Please call pharmacy   I wrote wrong instructions for prednisone     10mg pills, take 3 tabs every day for 5 days   Rx sent with new instructions

## 2023-09-18 NOTE — PROGRESS NOTES
Subjective   Patient ID: Smitha Frank is a 53 y.o. female who presents for Asthma (Asthma excerebration requesting prednisone. ).        HPI      53-year-old female with hypertension, obesity, history of diabetes, hyperlipidemia, mild intermittent asthma, former smoker (quit in 2010)     Here for asthma exacerbation   Sx : cough   No wheezing   Pox 94%   She relies on her apple watch for Pox   Triggers : seasonal variations and temp variations   Not on ICS- LABA at this time   Used to be on Breo , not sure why she has not been taking   During exacerbations, she takes combivent nbs and albuterol nebs     She is on Mounjaro 12.5mg / week, this was prescribed to help with weight loss   She is also on metformin     She is not on statin at this time , reportedly alternative was advised instead of Pravastatin        No LMP recorded.     Review of Systems    Constitutional : No feeling poorly / fevers/ chills / night sweats/ fatigue   Cardiovascular : No CP /Palpitations/ lower extremity edema / syncope   Respiratory : No Cough /SIDHU/Dyspnea at rest     CNS: No confusion / HA/ tingling/ numbness/ weakness of extremities  Psychiatric: No anxiety/ depression/ SI/HI    All other systems have been reviewed and are negative for complaint       Physical Exam    Constitutional : Vitals reviewed. Alert and in no distress  Cardiovascular : RRR, Normal S1, S2, No pericardial rub/ gallop, no peripheral edema   Pulmonary: No respiratory distress, CTAB   MSK : Normal gait and station , strength and tone     Neurologic : CNs 2-12 grossly intact , no obvious FNDs  Psych : A,Ox3, normal mood and affect      Assessment/Plan   Diagnoses and all orders for this visit:  Mild intermittent asthma without complication  -     albuterol 2.5 mg /3 mL (0.083 %) nebulizer solution; USE 1 UNIT DOSE EVERY 4-6 HOURS AS NEEDED FOR WHEEZING .  -     azithromycin (Zithromax) 250 mg tablet; Take 2 tablets (500 mg) by mouth once daily for 1 day, THEN 1 tablet  (250 mg) once daily for 4 days. Take 2 tabs (500 mg) by mouth today, than 1 daily for 4 days..  BMI 45.0-49.9, adult (CMS/[facility])  -     Referral to Bariatric Surgery; Future  Mixed hyperlipidemia  -     atorvastatin (Lipitor) 20 mg tablet; Take 1 tablet (20 mg) by mouth once daily.  Moderate persistent asthma with exacerbation  -     predniSONE (Deltasone) 10 mg tablet; Take 1 tablet (10 mg) by mouth once daily.  -     fluticasone furoate-vilanteroL (Breo Ellipta) 100-25 mcg/dose inhaler; Inhale 1 puff once daily.  Body mass index (BMI) of 45.0 to 49.9 in adult (CMS/[facility])  -     tirzepatide (Mounjaro) 12.5 mg/0.5 mL pen injector; Inject 12.5 mg under the skin every 7 days.  Type 2 diabetes mellitus without complication, unspecified whether long term insulin use (CMS/H[facility])  -     tirzepatide (Mounjaro) 12.5 mg/0.5 mL pen injector; Inject 12.5 mg under the skin every 7 days.  Encounter for medication review         # Asthma -  E   Rx with prednisone burst and Z pack ( pt tolerated Z pack , confirmed )   Strt ICS - laba     Prednisone induced hyperglycemia discussed , pt aware of this, this does not last for months     # HPL : Start Statin     # Dm2 + obesity   Since most recent A1C is well controlled , dc metformin   Continue Mounjaro for now   Will need to get these from weight loss mgmt in the future   Did discuss with her that Mounjaro cannot be continued for long term since it is not currently approved for weight loss   Since she has a coexisting diagnosis of DM2, it is currently being covered       # greater than 50% of the visit was spent in reviewing her meds since there were multiple meds on her list that she was not taking at this time     # she also reported a h/o CHF and was on lasix at that time   Recent echo reviewed

## 2023-10-05 ENCOUNTER — APPOINTMENT (OUTPATIENT)
Dept: NUTRITION | Facility: CLINIC | Age: 53
End: 2023-10-05
Payer: COMMERCIAL

## 2023-10-16 ENCOUNTER — TELEMEDICINE CLINICAL SUPPORT (OUTPATIENT)
Dept: NUTRITION | Facility: CLINIC | Age: 53
End: 2023-10-16
Payer: COMMERCIAL

## 2023-10-16 ENCOUNTER — OFFICE VISIT (OUTPATIENT)
Dept: PRIMARY CARE | Facility: CLINIC | Age: 53
End: 2023-10-16
Payer: COMMERCIAL

## 2023-10-16 VITALS
WEIGHT: 280.8 LBS | DIASTOLIC BLOOD PRESSURE: 82 MMHG | HEART RATE: 84 BPM | BODY MASS INDEX: 46.78 KG/M2 | HEIGHT: 65 IN | OXYGEN SATURATION: 97 % | SYSTOLIC BLOOD PRESSURE: 132 MMHG

## 2023-10-16 VITALS — WEIGHT: 280.65 LBS | BODY MASS INDEX: 45.1 KG/M2 | HEIGHT: 66 IN

## 2023-10-16 DIAGNOSIS — E66.01 SEVERE OBESITY (BMI >= 40) (MULTI): Primary | ICD-10-CM

## 2023-10-16 DIAGNOSIS — I10 PRIMARY HYPERTENSION: ICD-10-CM

## 2023-10-16 DIAGNOSIS — Z00.00 ROUTINE GENERAL MEDICAL EXAMINATION AT A HEALTH CARE FACILITY: Primary | ICD-10-CM

## 2023-10-16 DIAGNOSIS — Z23 NEED FOR INFLUENZA VACCINATION: ICD-10-CM

## 2023-10-16 DIAGNOSIS — E11.9 TYPE 2 DIABETES MELLITUS WITHOUT COMPLICATION, UNSPECIFIED WHETHER LONG TERM INSULIN USE (MULTI): ICD-10-CM

## 2023-10-16 DIAGNOSIS — I10 HYPERTENSION, UNSPECIFIED TYPE: ICD-10-CM

## 2023-10-16 DIAGNOSIS — Z12.11 SCREENING FOR COLON CANCER: ICD-10-CM

## 2023-10-16 DIAGNOSIS — J45.40 MODERATE PERSISTENT ASTHMA WITHOUT COMPLICATION (HHS-HCC): ICD-10-CM

## 2023-10-16 PROCEDURE — 97803 MED NUTRITION INDIV SUBSEQ: CPT | Mod: GT,U1,95 | Performed by: DIETITIAN, REGISTERED

## 2023-10-16 PROCEDURE — 90686 IIV4 VACC NO PRSV 0.5 ML IM: CPT | Performed by: STUDENT IN AN ORGANIZED HEALTH CARE EDUCATION/TRAINING PROGRAM

## 2023-10-16 PROCEDURE — 97803 MED NUTRITION INDIV SUBSEQ: CPT | Performed by: DIETITIAN, REGISTERED

## 2023-10-16 PROCEDURE — 3079F DIAST BP 80-89 MM HG: CPT | Performed by: STUDENT IN AN ORGANIZED HEALTH CARE EDUCATION/TRAINING PROGRAM

## 2023-10-16 PROCEDURE — 99396 PREV VISIT EST AGE 40-64: CPT | Performed by: STUDENT IN AN ORGANIZED HEALTH CARE EDUCATION/TRAINING PROGRAM

## 2023-10-16 PROCEDURE — 4010F ACE/ARB THERAPY RXD/TAKEN: CPT | Performed by: STUDENT IN AN ORGANIZED HEALTH CARE EDUCATION/TRAINING PROGRAM

## 2023-10-16 PROCEDURE — 3075F SYST BP GE 130 - 139MM HG: CPT | Performed by: STUDENT IN AN ORGANIZED HEALTH CARE EDUCATION/TRAINING PROGRAM

## 2023-10-16 PROCEDURE — 3044F HG A1C LEVEL LT 7.0%: CPT | Performed by: STUDENT IN AN ORGANIZED HEALTH CARE EDUCATION/TRAINING PROGRAM

## 2023-10-16 PROCEDURE — 3008F BODY MASS INDEX DOCD: CPT | Performed by: STUDENT IN AN ORGANIZED HEALTH CARE EDUCATION/TRAINING PROGRAM

## 2023-10-16 PROCEDURE — 99214 OFFICE O/P EST MOD 30 MIN: CPT | Performed by: STUDENT IN AN ORGANIZED HEALTH CARE EDUCATION/TRAINING PROGRAM

## 2023-10-16 PROCEDURE — 1036F TOBACCO NON-USER: CPT | Performed by: STUDENT IN AN ORGANIZED HEALTH CARE EDUCATION/TRAINING PROGRAM

## 2023-10-16 PROCEDURE — 90471 IMMUNIZATION ADMIN: CPT | Performed by: STUDENT IN AN ORGANIZED HEALTH CARE EDUCATION/TRAINING PROGRAM

## 2023-10-16 RX ORDER — LOSARTAN POTASSIUM 100 MG/1
100 TABLET ORAL DAILY
Qty: 90 TABLET | Refills: 1 | Status: SHIPPED | OUTPATIENT
Start: 2023-10-16 | End: 2024-05-30

## 2023-10-16 RX ORDER — BUDESONIDE 1 MG/2ML
1 INHALANT ORAL
Qty: 60 ML | Refills: 3 | Status: SHIPPED | OUTPATIENT
Start: 2023-10-16

## 2023-10-16 RX ORDER — BISMUTH SUBSALICYLATE 262 MG
1 TABLET,CHEWABLE ORAL DAILY
COMMUNITY

## 2023-10-16 NOTE — PROGRESS NOTES
"  Subjective     Patient ID: Smitha Frank is a 53 y.o. female who presents for Annual Exam (CPE. ).      Last Physical : ___Years ago     Pt's PMH, PSH, SH, FH , meds and allergies was obtained / reviewed and updated .     Dental visits : Y  Vision issues : N  Hearing issues : N    Immunizations : Y    Diet :  could be better  Exercise:  Weight concerns :     Alcohol: as noted in   Tobacco: as noted in   Recreational drug use : None/ as noted in         Metabolic screening   - Lipid   - Glucose  ======================================================    Visit Vitals  /82   Pulse 84   Ht 1.659 m (5' 5.31\")   Wt 127 kg (280 lb 12.8 oz)   SpO2 97%   BMI 46.29 kg/m²   Smoking Status Former   BSA 2.42 m²      No LMP recorded.     =====================================    Review of systems:  Constitutional: no chills, no fever and no night sweats.     Eyes: no blurred vision and no eyesight problems.     ENT: no hearing loss, no nasal congestion, no nasal discharge, no hoarseness and no sore throat.     Cardiovascular: no chest pain, no intermittent leg claudication, no lower extremity edema, no palpitations and no syncope.     Respiratory: no cough, no shortness of breath during exertion, no shortness of breath at rest and no wheezing.     Gastrointestinal: no abdominal pain, no blood in stools, no constipation, no diarrhea, no melena, no nausea, no rectal pain and no vomiting.     Genitourinary: no dysuria, no change in urinary frequency, no urinary hesitancy, no feelings of urinary urgency and no vaginal discharge.     Musculoskeletal: no arthralgias, no back pain and no myalgias.     Integumentary: no new skin lesions and no rashes.     Neurological: no difficulty walking, no headache, no limb weakness, no numbness and no tingling.     Psychiatric: no anxiety, no depression, no anhedonia and no substance use disorders.   ============================================================    Physical exam " :    Constitutional: Alert and in no acute distress. Well developed, well nourished.     Eyes: Normal external exam. Pupils were equal in size, round, reactive to light (PERRL) with normal accommodation and extraocular movements intact (EOMI).     Ears, Nose, Mouth, and Throat: External inspection of ears and nose: Normal.  Otoscopic examination: Normal.      Neck: No neck mass was observed. Supple.     Cardiovascular: Heart rate and rhythm were normal, normal S1 and S2, no gallops, no murmurs and no pericardial rub    Pulmonary: No respiratory distress. Clear bilateral breath sounds.     Abdomen: Soft nontender; no abdominal mass palpated. No organomegaly.     Musculoskeletal: No joint swelling seen, normal movements of all extremities. Range of motion: Normal.  Muscle strength/tone: Normal.      Neurologic: Deep tendon reflexes were 2+ and symmetric. Sensation: Normal.     Psychiatric: Judgment and insight: Intact. Mood and affect: Normal.        Assessment/Plan    Diagnoses and all orders for this visit:  Routine general medical examination at a health care facility  Need for influenza vaccination  -     Flu vaccine (IIV4) age 6 months and greater, preservative free  Type 2 diabetes mellitus without complication, unspecified whether long term insulin use (CMS/[facility])  Primary hypertension  -     losartan (Cozaar) 100 mg tablet; Take 1 tablet (100 mg) by mouth once daily.  Hypertension, unspecified type  Screening for colon cancer  -     Colonoscopy Screening; Future  Moderate persistent asthma without complication  -     budesonide (Pulmicort) 1 mg/2 mL nebulizer solution; Take 2 mL (1 mg) by nebulization 2 times a day.  Body mass index (BMI) of 45.0 to 49.9 in adult (CMS/[facility])        53-year-old female with hypertension, obesity, history of diabetes, hyperlipidemia, mild intermittent asthma, former smoker (quit in 2010)     HTN: at goal   On Losartan   Take Lasix only with fluid retention when she uses prednisone  for asthma  - E   H/o DM2 : Resolved based on recent A1c and with mounjaro   Asthma   Gabapentin prn use for joint pain after covid 19 infection     HM :   Vaccines:  -Tdap : due 2027  -Flu :  given today  -Shingles :  received first dose in 2022 August     Cancer screenings:  -Mammogram :  current   -Colonoscopy:   -PAP : to fu with gyn     General preventive care discussed which includes regular exercise, healthy eating habits, to include more of plant based diet, to include foods of all colors  , limiting excessive red meat intake , staying active to maintain a weight that is appropriate for his/ her height / making efforts to reach an ideal weight for height,  avoidance of smoking, excess alcohol consumption, avoidance of recreational drugs, safe and responsible sexual relationships and practices.     Calcium + Vit D supplements recommended   Regular exercise recommended   Healthy eating choices discussed and recommended   BMI ( Body mass index ) discussed and encouraged weight loss through the above discussed lifestyle modifications .     Conditions addressed and mgmt as noted above.  Pertinent labs, images/ imaging reports , chart review was done .   Age appropriate labs / labs for mgmt of chronic medical conditions ordered, further mgmt pending the results.

## 2023-10-16 NOTE — PROGRESS NOTES
Reason for Nutrition Visit:  Pt is a 53 y.o. female being seen remotely. She has been referred to nutrition by Kirsten Ayers MD on July, 10, 2023.   1. Severe obesity (BMI >= 40) (CMS/[facility])           Past Medical Hx:  Patient Active Problem List   Diagnosis    Abnormal electrocardiogram    Acute bronchitis    Bronchitis    Acute reaction to stress    Allergic rhinitis    Anxiety    Arthritis of knee, left    Arthritis of left hip    Asthma    Asthma with acute exacerbation    Chest pain    Cold intolerance    Chronic cough    Cough    Cushingoid facies    Diabetes mellitus, type 2 (CMS/HCC)    Coronavirus infection    Disease due to severe acute respiratory syndrome coronavirus 2 (SARS-CoV-2)    Edema    Elbow pain    Esophageal reflux    Fatigue    Female pelvic pain    Hay fever    Hidradenitis    Hyperlipidemia    Hypertension    Impaired fasting glucose    Leg cramps    Endometriosis    Severe obesity (BMI >= 40) (CMS/HCC)    Obesity    Obstructive sleep apnea    Pain in joint of right shoulder    Pap smear abnormality of cervix with ASCUS favoring benign    Polyphagia    Post-nasal drip    Post-viral disorder    Sciatica of left side    Seasonal allergies    SOB (shortness of breath)    Steroid-induced myopathy    Subacromial bursitis    Tachycardia    Trochanteric bursitis of left hip    Upper respiratory infection    Vitamin D deficiency    History of tubal ligation    Low back pain    Menorrhagia with irregular cycle    Nicotine dependence    Uterine leiomyoma        Current Outpatient Medications:     acetaminophen (Tylenol) 325 mg tablet, Take 2 tablets (650 mg) by mouth every 4 hours if needed., Disp: , Rfl:     albuterol 2.5 mg /3 mL (0.083 %) nebulizer solution, USE 1 UNIT DOSE EVERY 4-6 HOURS AS NEEDED FOR WHEEZING ., Disp: 75 mL, Rfl: 3    albuterol 90 mcg/actuation inhaler, INHALE 2 PUFFS BY MOUTH EVERY 4 TO 6 HOURS AS NEEDED, Disp: 18 g, Rfl: 3    ascorbic acid (Vitamin C) 500 mg chewable  tablet, Chew 1 tablet (500 mg) once daily., Disp: , Rfl:     atorvastatin (Lipitor) 20 mg tablet, Take 1 tablet (20 mg) by mouth once daily., Disp: 90 tablet, Rfl: 3    blood sugar diagnostic (Accu-Chek Guide test strips) strip, TEST twice a day, Disp: , Rfl:     budesonide (Pulmicort) 1 mg/2 mL nebulizer solution, Take 2 mL (1 mg) by nebulization 2 times a day., Disp: , Rfl:     cholecalciferol (Vitamin D-3) 1,250 mcg (50,000 unit) capsule, Take 1 capsule (50,000 Units) by mouth every 14 (fourteen) days., Disp: , Rfl:     cholecalciferol (Vitamin D-3) 5,000 Units tablet, Take by mouth., Disp: , Rfl:     cyanocobalamin (Vitamin B-12) 500 mcg tablet, Take 1 tablet (500 mcg) by mouth once daily., Disp: , Rfl:     desonide (DesOwen) 0.05 % cream, , Disp: , Rfl:     diphenhydrAMINE (BENADryl) 50 mg capsule, Take 1 capsule (50 mg) by mouth once daily as needed., Disp: , Rfl:     ergocalciferol (Vitamin D-2) 1.25 MG (10735 UT) capsule, Take 1 capsule (50,000 Units) by mouth every 30 (thirty) days., Disp: , Rfl:     fluticasone (Flonase) 50 mcg/actuation nasal spray, Administer 4 sprays into affected nostril(s) 3 times a day., Disp: , Rfl:     fluticasone furoate-vilanteroL (Breo Elipta) 100-25 mcg/dose inhaler, Inhale 1 puff once daily., Disp: , Rfl:     fluticasone furoate-vilanteroL (Breo Ellipta) 100-25 mcg/dose inhaler, Inhale 1 puff once daily., Disp: 60 each, Rfl: 11    fluticasone furoate-vilanteroL (Breo Ellipta) 200-25 mcg/dose inhaler, Inhale 1 puff once daily., Disp: , Rfl:     furosemide (Lasix) 20 mg tablet, Take 1 tablet (20 mg) by mouth 2 times a day., Disp: 60 tablet, Rfl: 0    furosemide (Lasix) 40 mg tablet, Take 1 tablet (40 mg) by mouth twice a day., Disp: , Rfl:     gabapentin (Neurontin) 100 mg capsule, Take 1-2 capsules (100-200 mg) by mouth 3 times a day as needed., Disp: , Rfl:     gentamicin (Garamycin) 0.1 % cream, , Disp: , Rfl:     guaiFENesin (Mucinex) 600 mg 12 hr tablet, Take 1 tablet (600  "mg) by mouth twice a day., Disp: , Rfl:     hydrocodone-homatropine 5-1.5 mg/5 mL syrup, Take 5 mL by mouth every 4 hours if needed., Disp: , Rfl:     ipratropium-albuteroL (Combivent Respimat)  mcg/actuation inhaler, Inhale 4 times a day., Disp: , Rfl:     ipratropium-albuteroL (Duo-Neb) 0.5-2.5 mg/3 mL nebulizer solution, Inhale 3 mL every 6 hours., Disp: , Rfl:     ipratropium/albuterol sulfate (COMBIVENT RESPIMAT INHL), Inhale 2 times a day as needed., Disp: , Rfl:     latanoprost (Xalatan) 0.005 % ophthalmic solution, Administer into affected eye(s)., Disp: , Rfl:     losartan (Cozaar) 100 mg tablet, Take 1 tablet (100 mg) by mouth once daily., Disp: , Rfl:     magnesium gluconate (Magonate) 27.5 mg magne- sium (500 mg) tablet, Take 200 mg by mouth 2 times a day., Disp: , Rfl:     magnesium oxide (Mag-Ox) 400 mg tablet, Take 1 tablet (400 mg) by mouth once daily., Disp: , Rfl:     montelukast (Singulair) 10 mg tablet, Take 1 tablet (10 mg) by mouth once daily at bedtime., Disp: 90 tablet, Rfl: 3    naproxen (Naprosyn) 500 mg tablet, Take 1 tablet (500 mg) by mouth every 12 hours., Disp: , Rfl:     pantoprazole (ProtoNix) 40 mg EC tablet, Take 1 tablet (40 mg) by mouth once daily., Disp: , Rfl:     potassium chloride CR (K-Tab) 20 mEq ER tablet, Take 1 tablet (20 mEq) by mouth once daily., Disp: 90 tablet, Rfl: 3    predniSONE (Deltasone) 10 mg tablet, Take 3 tablets once every day for 5 days, Disp: 15 tablet, Rfl: 0    tirzepatide (Mounjaro) 12.5 mg/0.5 mL pen injector, Inject 12.5 mg under the skin every 7 days., Disp: 6 mL, Rfl: 1     Anthropometrics:  Anthropometrics  Height: 166.4 cm (5' 5.51\")  Weight: 127 kg (280 lb 10.3 oz)  BMI (Calculated): 45.98  Weight Change  Weight History / % Weight Change: Pt has lost 11 lbs within the past three months. Wt at the last appt was 291 lbs. Wt at the initial appt was 307 lb.  Significant Weight Loss: No   Significant Weight Change: No    Lab Results   Component " Value Date    HGBA1C 5.5 07/10/2023    CHOL 212 (H) 07/10/2023    LDLF 140 (H) 07/10/2023    TRIG 97 07/10/2023    HDL 52.8 07/10/2023        Chemistry    Lab Results   Component Value Date/Time     07/10/2023 1113    K 4.3 07/10/2023 1113     07/10/2023 1113    CO2 24 07/10/2023 1113    BUN 13 07/10/2023 1113    CREATININE 0.53 07/10/2023 1113    Lab Results   Component Value Date/Time    CALCIUM 9.2 07/10/2023 1113    ALKPHOS 68 11/01/2022 0840    AST 11 11/01/2022 0840    ALT 14 11/01/2022 0840    BILITOT 0.3 11/01/2022 0840         Food and Nutrition Hx:  Pt states she has been focusing on eating and movement. She has having more energy. She has been on a wt loss medication, Mounjaro. Pt wakes up at 5:30 am.      24 Diet Recall:  Meal 1: Breakfast is at 7:30 to include a handful of grapes and handful of carrots (kcal 100)  Meal 2: Lunch is at 12:15 to include soup -chicken noodle soup from the canned (kcal 250, Na 1,400) or sometimes a banana (kcal 120)   Snack may be popcorn or carrots.   Meal 3: Dinner is at 5:30-6:00 am to include grilled lamb chop with [place] Seasoning (salt free), 1 cup of  baked potato,  and 1.5 cups of zucchini, onions or garlic. She may use spray bottle of avocado oil.    Snacks: A bedtime snack may be at 8:00 to include 0.25 cup of trail mix.   Beverages: A cup of coffee is consumed in the morning with 2 packets or teaspoons of Monk Fruit (CHO 8) and 48 ounces of water per day.   Protein intake is inadequate again.     Allergies: None  Intolerance: dairy and whey   Appetite: Good  Intake: >75%  GI Symptoms : None Frequency: absent  Swallowing Difficulty: No problems with swallowing  Dentition : own    Types of Activities: Steps per day   Duration: 3,800 steps per day* daily    Sleep duration/quality : 7+ hours and disrupted sleep  Sleep disorders: none    Supplements: Multivitamin, Magnesium, Zinc, Potassium, Ashwagandha, and black cohosh   daily    Feelings of Hunger?:  Yes and will eat  Physical Feeling: Varies depending on meal consumed  Binging: Never  Cravings: Other. Savory flavors.   Energy Levels: Stable    Food Preparation: Patient  Cooking Skills/Barriers: None reported  Grocery Shopping: Patient    Eating Out Type: Restaurant  1 times per week  Convenience Foods: Frozen Meals and Canned Soup  3 times per week    Nutrition Focused Physical Exam:    Performed/Deferred: Deferred due to be being virtual visit    Estimated Energy Needs:    Weight Maintanence: 2,100 kcal/day  Weight Loss Needs: 1,584 kcal/day  MSJ x AF - 500 calories for wt loss.     Nutrition Diagnosis:    Diagnosis Statement 1:  Diagnosis Status: Ongoing/improving   Diagnosis : Food and nutrition related knowledge deficit related to  how to eat for wt loss with current BMI at 50  as evidenced by  reports by pt of the need to learn     Diagnosis Statement 2:  Diagnosis Status: Ongoing  Diagnosis : Predicted excessive energy intake  related to  lack of meal structure and schedule with meal that omit protein slowing down metabolism  as evidenced by  reports by pt of the inability to lose wt despite kcal intake reported    Diagnosis Statement 3:   Diagnosis Status: New  Diagnosis : Inadequate protein intake  related to perception that time, interpersonal, or financial constraints prevent changes as protein can be omitted at meals due to lack of planning and lack of time during work as evidenced by  protein intake does not meet daily needs    Nutrition Interventions:  Medical nutrition therapy was given for wt loss.   Nutrition Counseling: Motivational Interviewing  Coordination of Care: None    Nutrition Goals:  1,584 calories per day for 1 lb wt loss per week. CHO consistent intake of 30-45 grams of CHO at meals in the presence of diabetes even though A1c is normal. Heart healthy meal plan with a low saturated fat intake to <5 -6 % of energy (less than 10.5 grams of saturated fat per day), reduced intake of  added sugars (<10% of total energy), 25 grams of fiber with intake of viscous fiber to 5 g/day to 10 g/day, plant sterols/stanols to 2 g/day, and 1.1 gram of omega three fatty acids to reduce LDL cholesterol. 1,500- 2,000 mg sodium restriction per day to reduce edema. 2 liter fluid restriction.   Nutrition Recommendations:  Via teach back method patient verbalized understanding of the following topics:  1) Aim to advance steps to 7,000 steps per day. To start advancing steps, start with averaging 4,000 steps per day and work up.   2) Strive to include protein at the meals and even snacks. Protein at meals can increase the metabolism too.  Protein at snacks can sustain energy, stabilize blood glucose, and provide more contentment. Protein foods include eggs, egg whites, cheese, nuts, nut butters, Greek yogurt, poultry, meat, fish, tofu, plant based protein powder, and/or plant based protein drinks.   3) Strive to switch from canned soup to tuna and crackers, PB&J or a plant based, gluten free meal replacement or protein drink.     Educational Handouts: [facility] Weight Loss & Metabolism  Previous handouts given: Plate Method, CHF Nutrition Therapy     Fanny Monterroso, MS, RDN, LD, YIN   Advanced Practice Clinical Dietitian  [email or web address]  Schedule line [phone]  Direct line [phone]     Readiness to Change : Fair  Level of Understanding : Fair  Anticipated Compliant : Fair

## 2023-10-16 NOTE — PATIENT INSTRUCTIONS
1) Aim to advance steps to 7,000 steps per day. To start advancing steps, start with averaging 4,000 steps per day and work up.   2) Strive to include protein at the meals and even snacks. Protein at meals can increase the metabolism too.  Protein at snacks can sustain energy, stabilize blood glucose, and provide more contentment. Protein foods include eggs, egg whites, cheese, nuts, nut butters, Greek yogurt, poultry, meat, fish, tofu, plant based protein powder, and/or plant based protein drinks.   3) Strive to switch from canned soup to tuna and crackers, PB&J or a plant based, gluten free meal replacement or protein drink.     Educational Handouts: [facility] Weight Loss [facility] Metabolism  Previous handouts given: Plate Method, CHF Nutrition Therapy     Fanny Monterroso, MS, RDN, LD, YIN   Advanced Practice Clinical Dietitian  [email or web address]  Schedule line [phone]  Direct line [phone]

## 2023-11-30 ENCOUNTER — TELEPHONE (OUTPATIENT)
Dept: PRIMARY CARE | Facility: CLINIC | Age: 53
End: 2023-11-30
Payer: COMMERCIAL

## 2023-11-30 DIAGNOSIS — E66.01 OBESITY, CLASS III, BMI 40-49.9 (MORBID OBESITY) (MULTI): Primary | ICD-10-CM

## 2023-11-30 NOTE — TELEPHONE ENCOUNTER
PT called in and stated that she will need a referral for the weight loss program fit for me. PT stated that the program is ran by endocrinology now please advise

## 2023-12-05 ENCOUNTER — APPOINTMENT (OUTPATIENT)
Dept: PRIMARY CARE | Facility: CLINIC | Age: 53
End: 2023-12-05
Payer: COMMERCIAL

## 2023-12-07 ENCOUNTER — TELEPHONE (OUTPATIENT)
Dept: PRIMARY CARE | Facility: CLINIC | Age: 53
End: 2023-12-07
Payer: COMMERCIAL

## 2023-12-07 NOTE — TELEPHONE ENCOUNTER
Patient is calling to get a rx for her asthma for prednisone and zpak can you please give her a call

## 2023-12-08 ENCOUNTER — TELEMEDICINE (OUTPATIENT)
Dept: PRIMARY CARE | Facility: CLINIC | Age: 53
End: 2023-12-08
Payer: COMMERCIAL

## 2023-12-08 DIAGNOSIS — J45.40 MODERATE PERSISTENT ASTHMA WITHOUT COMPLICATION (HHS-HCC): Primary | ICD-10-CM

## 2023-12-08 PROCEDURE — 99214 OFFICE O/P EST MOD 30 MIN: CPT | Performed by: STUDENT IN AN ORGANIZED HEALTH CARE EDUCATION/TRAINING PROGRAM

## 2023-12-08 RX ORDER — PREDNISONE 20 MG/1
20 TABLET ORAL DAILY
Qty: 5 TABLET | Refills: 0 | Status: SHIPPED | OUTPATIENT
Start: 2023-12-08 | End: 2024-06-05

## 2023-12-08 RX ORDER — AZITHROMYCIN 250 MG/1
TABLET, FILM COATED ORAL
Qty: 6 TABLET | Refills: 0 | Status: SHIPPED | OUTPATIENT
Start: 2023-12-08 | End: 2023-12-11

## 2023-12-08 NOTE — PROGRESS NOTES
Subjective   Patient ID: Smitha Frank is a 53 y.o. female who presents for ASthma         HPI  Televisit   Pt sx onset on Monday with cough and SOB   She reportedly has been calling the office since Monday but neither the  nor me are aware of it .   She was scheduled for an appt today after MA received the call yesterday     Her previous PCP used to start her on prednisone and Z pack for asthma E      Visit Vitals  Smoking Status Former      No LMP recorded.     Review of Systems    Constitutional : No feeling poorly / fevers/ chills / night sweats/ fatigue   Cardiovascular : No CP /Palpitations/ lower extremity edema / syncope   Respiratory : As noted in HPI   Psychiatric: No anxiety/ depression/ SI/HI    All other systems have been reviewed and are negative for complaint       Physical Exam    Limited physical due to the virtual nature of this visit ( real time audio- visual )  Constitutional : Alert, in no distress     Pulm : Normal work of breathing   CNS : Moves all extremities symmetrically   Psych:  A , O X 3 normal mood and effect, speaks coherently     Assessment/Plan   Diagnoses and all orders for this visit:  Moderate persistent asthma without complication  -     predniSONE (Deltasone) 20 mg tablet; Take 1 tablet (20 mg) by mouth once daily.  -     azithromycin (Zithromax) 250 mg tablet; Take 2 tablets (500 mg) by mouth once daily for 1 day, THEN 1 tablet (250 mg) once daily for 4 days. Take 2 tabs (500 mg) by mouth today, than 1 daily for 4 days..        Rx as above   I did discuss with her today as well at the previous visits that I will not be prescribing Prednisone and abx everytime she calls for asthma - E . She will need to be seen , to which she is not agreeable and did express her disagreeability     WE have not been able to est a rapport over these last few visits . Pt expressed that she would like to est care with another physician to which I agree     This will likely be the last visit  with me and I wish her the best for her health    Conditions addressed and mgmt as noted above.  Pertinent labs, images/ imaging reports , chart review was done .

## 2023-12-11 ENCOUNTER — TELEPHONE (OUTPATIENT)
Dept: PULMONOLOGY | Facility: CLINIC | Age: 53
End: 2023-12-11
Payer: COMMERCIAL

## 2023-12-11 DIAGNOSIS — J45.41 MODERATE PERSISTENT ASTHMA WITH ACUTE EXACERBATION (HHS-HCC): Primary | ICD-10-CM

## 2023-12-11 RX ORDER — DOXYCYCLINE 100 MG/1
CAPSULE ORAL
Qty: 11 CAPSULE | Refills: 0 | Status: SHIPPED | OUTPATIENT
Start: 2023-12-11

## 2023-12-11 RX ORDER — PREDNISONE 10 MG/1
TABLET ORAL
Qty: 30 TABLET | Refills: 0 | Status: SHIPPED | OUTPATIENT
Start: 2023-12-11 | End: 2024-01-10

## 2023-12-11 RX ORDER — AZITHROMYCIN 250 MG/1
TABLET, FILM COATED ORAL
Qty: 6 TABLET | Refills: 0 | Status: SHIPPED | OUTPATIENT
Start: 2023-12-11 | End: 2023-12-11

## 2023-12-11 NOTE — PROGRESS NOTES
Patient reports that she is having an asthma flare and will place on prednisone and doxycycline.  She will keep me posted.

## 2023-12-11 NOTE — TELEPHONE ENCOUNTER
Patient is requesting prednisone and an abx to be sent to her pharmacy. Patient sts she is having an asthma flare. Please call to advise   
intermittent

## 2023-12-18 RX ORDER — TRAVOPROST OPHTHALMIC SOLUTION 0.04 MG/ML
SOLUTION OPHTHALMIC
COMMUNITY
Start: 2023-10-30

## 2023-12-19 ENCOUNTER — OFFICE VISIT (OUTPATIENT)
Dept: PRIMARY CARE | Facility: CLINIC | Age: 53
End: 2023-12-19
Payer: COMMERCIAL

## 2023-12-19 VITALS
SYSTOLIC BLOOD PRESSURE: 114 MMHG | DIASTOLIC BLOOD PRESSURE: 77 MMHG | OXYGEN SATURATION: 97 % | HEIGHT: 66 IN | WEIGHT: 279.06 LBS | HEART RATE: 98 BPM | BODY MASS INDEX: 44.85 KG/M2

## 2023-12-19 DIAGNOSIS — E66.01 OBESITY, CLASS III, BMI 40-49.9 (MORBID OBESITY) (MULTI): ICD-10-CM

## 2023-12-19 PROCEDURE — 3008F BODY MASS INDEX DOCD: CPT | Performed by: FAMILY MEDICINE

## 2023-12-19 PROCEDURE — 99214 OFFICE O/P EST MOD 30 MIN: CPT | Performed by: FAMILY MEDICINE

## 2023-12-19 PROCEDURE — 4010F ACE/ARB THERAPY RXD/TAKEN: CPT | Performed by: FAMILY MEDICINE

## 2023-12-19 PROCEDURE — 3044F HG A1C LEVEL LT 7.0%: CPT | Performed by: FAMILY MEDICINE

## 2023-12-19 PROCEDURE — 3078F DIAST BP <80 MM HG: CPT | Performed by: FAMILY MEDICINE

## 2023-12-19 PROCEDURE — 99204 OFFICE O/P NEW MOD 45 MIN: CPT | Performed by: FAMILY MEDICINE

## 2023-12-19 PROCEDURE — 1036F TOBACCO NON-USER: CPT | Performed by: FAMILY MEDICINE

## 2023-12-19 PROCEDURE — 3074F SYST BP LT 130 MM HG: CPT | Performed by: FAMILY MEDICINE

## 2023-12-19 ASSESSMENT — COLUMBIA-SUICIDE SEVERITY RATING SCALE - C-SSRS
2. HAVE YOU ACTUALLY HAD ANY THOUGHTS OF KILLING YOURSELF?: NO
6. HAVE YOU EVER DONE ANYTHING, STARTED TO DO ANYTHING, OR PREPARED TO DO ANYTHING TO END YOUR LIFE?: NO
1. IN THE PAST MONTH, HAVE YOU WISHED YOU WERE DEAD OR WISHED YOU COULD GO TO SLEEP AND NOT WAKE UP?: NO

## 2023-12-19 ASSESSMENT — ENCOUNTER SYMPTOMS
DEPRESSION: 0
LOSS OF SENSATION IN FEET: 0
OCCASIONAL FEELINGS OF UNSTEADINESS: 0

## 2023-12-19 ASSESSMENT — PAIN SCALES - GENERAL: PAINLEVEL: 0-NO PAIN

## 2023-12-19 NOTE — PROGRESS NOTES
Smitha Smith I a 53 y.o. female here for initial evaluation for treatment of obesity.    Past Medical History:   Diagnosis Date    Acute pharyngitis, unspecified 2014    Sore throat    Cough, unspecified 2014    Cough    Other abnormal and inconclusive findings on diagnostic imaging of breast 2015    Abnormal screening mammogram    Personal history of other benign neoplasm 2014    History of uterine leiomyoma    Personal history of other diseases of the respiratory system 2014    History of acute bronchitis    Unspecified asthma, uncomplicated 2022    Asthma       Tobacco Use: Medium Risk (2023)    Patient History     Smoking Tobacco Use: Former     Smokeless Tobacco Use: Never     Passive Exposure: Not on file     Alcohol Use: Not on file       Diabetes, High blood pressure, High cholesterol, Asthma, and Anxiety  She has a surgical history of fibroids, partial hysterectomy, and appendectomy. Her asthma has been quite severe in the past, requiring large doses of prednisone, to which she attributes weight gain and difficulty losing weight.     The family history is significant for:  Father who  at age 71 from sepsis. He had had a stroke, multiple sclerosis, and was hospitalized with c. Difficile colitis prior to death. Smitha's mother is 87 and has memory problems. She has also had a stroke and had hypertension. Smitha has a 64 year old sister, who has survived breast cancer, and has hypertension and high cholesterol. She has a 27 year old son with autism who lives with her. She is . Her  is 67 and has had multiple back surgeries, hypertension, and diabetes. He is overweight but not obese. A younger son  in an accident.     She quit smoking in .    Prior weight loss attempts include. GLP-1 agonist. She has been taking Mounjaro since 2022.     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    Her other eating habits include having lunch around 11AM (usually soup and salad). She doesn't snack much. Dinner is between 5:45PM and 7:30PM. She had steak, mixed vegetables, and a baked potato last night. No late night eating.     Her beverage habits include:  Low calorie or diet beverages. She drinks iced tea and diet Pepsi, with water on occasion. Alcohol consumption is minimal.     Her physical activity habits includeLargely sedentary      Her motivation is. Increase life expectancy    She feels well.    On physical examination   Vitals:    12/19/23 0835   BP: 114/77   Pulse: 98   SpO2:        Body mass index is 45.73 kg/m².      Overall Impression: Very pleasant, engaged woman with a number of health issues which would improve with weight loss.     Goals included our Standard Fitter me goals with implementation counseling by Dr. Huy Hylton. Followup in  3 months.     No food or drink after 7PM, Drink only water at all times., Have a protein-rich breakfast within 30 minutes of waking up., and Thirty minutes of continuous physical activity 7 days a week.

## 2024-01-16 ENCOUNTER — APPOINTMENT (OUTPATIENT)
Dept: SURGERY | Facility: HOSPITAL | Age: 54
End: 2024-01-16
Payer: COMMERCIAL

## 2024-01-16 ENCOUNTER — APPOINTMENT (OUTPATIENT)
Dept: SURGERY | Facility: CLINIC | Age: 54
End: 2024-01-16
Payer: COMMERCIAL

## 2024-04-23 ENCOUNTER — OFFICE VISIT (OUTPATIENT)
Dept: PRIMARY CARE | Facility: CLINIC | Age: 54
End: 2024-04-23
Payer: COMMERCIAL

## 2024-04-23 VITALS
WEIGHT: 279.06 LBS | DIASTOLIC BLOOD PRESSURE: 84 MMHG | OXYGEN SATURATION: 98 % | HEIGHT: 66 IN | SYSTOLIC BLOOD PRESSURE: 123 MMHG | HEART RATE: 91 BPM | BODY MASS INDEX: 44.85 KG/M2

## 2024-04-23 DIAGNOSIS — E66.01 SEVERE OBESITY (BMI >= 40) (MULTI): Primary | ICD-10-CM

## 2024-04-23 PROCEDURE — 4010F ACE/ARB THERAPY RXD/TAKEN: CPT | Performed by: FAMILY MEDICINE

## 2024-04-23 PROCEDURE — 3074F SYST BP LT 130 MM HG: CPT | Performed by: FAMILY MEDICINE

## 2024-04-23 PROCEDURE — 99213 OFFICE O/P EST LOW 20 MIN: CPT | Performed by: FAMILY MEDICINE

## 2024-04-23 PROCEDURE — 3008F BODY MASS INDEX DOCD: CPT | Performed by: FAMILY MEDICINE

## 2024-04-23 PROCEDURE — 1036F TOBACCO NON-USER: CPT | Performed by: FAMILY MEDICINE

## 2024-04-23 PROCEDURE — 3079F DIAST BP 80-89 MM HG: CPT | Performed by: FAMILY MEDICINE

## 2024-04-23 RX ORDER — PHENTERMINE HYDROCHLORIDE 37.5 MG/1
37.5 CAPSULE ORAL
Qty: 30 CAPSULE | Refills: 2 | Status: SHIPPED | OUTPATIENT
Start: 2024-04-23 | End: 2024-07-22

## 2024-04-23 ASSESSMENT — PATIENT HEALTH QUESTIONNAIRE - PHQ9
1. LITTLE INTEREST OR PLEASURE IN DOING THINGS: NOT AT ALL
SUM OF ALL RESPONSES TO PHQ9 QUESTIONS 1 AND 2: 0
2. FEELING DOWN, DEPRESSED OR HOPELESS: NOT AT ALL

## 2024-04-23 ASSESSMENT — ENCOUNTER SYMPTOMS
DEPRESSION: 0
OCCASIONAL FEELINGS OF UNSTEADINESS: 0
LOSS OF SENSATION IN FEET: 0

## 2024-04-23 ASSESSMENT — COLUMBIA-SUICIDE SEVERITY RATING SCALE - C-SSRS
6. HAVE YOU EVER DONE ANYTHING, STARTED TO DO ANYTHING, OR PREPARED TO DO ANYTHING TO END YOUR LIFE?: NO
2. HAVE YOU ACTUALLY HAD ANY THOUGHTS OF KILLING YOURSELF?: NO
1. IN THE PAST MONTH, HAVE YOU WISHED YOU WERE DEAD OR WISHED YOU COULD GO TO SLEEP AND NOT WAKE UP?: NO

## 2024-04-23 ASSESSMENT — PAIN SCALES - GENERAL: PAINLEVEL: 0-NO PAIN

## 2024-04-23 NOTE — PROGRESS NOTES
"Subjective   Patient ID: Smitha Frank is a 54 y.o. female who presents for Follow-up (Fitter Me).    Smitha has had a number of struggles. Her mother has been diagnosed with dementia, and is now living with her. Her  is partially disabled with a back problem. She has been adherent with our nutrition goals. Physical activity has been limited. She also has been unable to obtain Mounjaro - due to supply shortages, which persist. She feels generally well. Weight is unchanged from December 2023 (5 months) at 279lbs.         Objective   /84 (BP Location: Right arm, Patient Position: Sitting, BP Cuff Size: Large adult)   Pulse 91   Ht 1.664 m (5' 5.5\")   Wt 127 kg (279 lb 1 oz)   SpO2 98%   BMI 45.73 kg/m²     Physical Exam  Constitutional:       Appearance: She is obese. She is not ill-appearing.         Assessment/Plan :  To get her back on track, reenforced all four goals with an emphasis upon nutrition goals. Also, trial of phentermine for 3 months. F/U 3-4 months.          "

## 2024-05-28 DIAGNOSIS — I10 PRIMARY HYPERTENSION: ICD-10-CM

## 2024-05-30 RX ORDER — LOSARTAN POTASSIUM 100 MG/1
100 TABLET ORAL DAILY
Qty: 90 TABLET | Refills: 1 | Status: SHIPPED | OUTPATIENT
Start: 2024-05-30

## 2024-09-22 DIAGNOSIS — I10 HYPERTENSION, UNSPECIFIED TYPE: ICD-10-CM

## 2024-09-22 DIAGNOSIS — J40 BRONCHITIS: ICD-10-CM

## 2024-09-24 RX ORDER — MONTELUKAST SODIUM 10 MG/1
10 TABLET ORAL NIGHTLY
Qty: 90 TABLET | Refills: 0 | Status: SHIPPED | OUTPATIENT
Start: 2024-09-24

## 2024-09-24 RX ORDER — POTASSIUM CHLORIDE 20 MEQ/1
20 TABLET, EXTENDED RELEASE ORAL DAILY
Qty: 90 TABLET | Refills: 0 | Status: SHIPPED | OUTPATIENT
Start: 2024-09-24

## 2024-11-15 ENCOUNTER — OFFICE VISIT (OUTPATIENT)
Dept: PRIMARY CARE | Facility: HOSPITAL | Age: 54
End: 2024-11-15
Payer: COMMERCIAL

## 2024-11-15 VITALS — BODY MASS INDEX: 43.53 KG/M2 | WEIGHT: 265.6 LBS | SYSTOLIC BLOOD PRESSURE: 118 MMHG | DIASTOLIC BLOOD PRESSURE: 64 MMHG

## 2024-11-15 DIAGNOSIS — E11.9 TYPE 2 DIABETES MELLITUS WITHOUT COMPLICATION, UNSPECIFIED WHETHER LONG TERM INSULIN USE (MULTI): ICD-10-CM

## 2024-11-15 PROCEDURE — 99213 OFFICE O/P EST LOW 20 MIN: CPT | Performed by: FAMILY MEDICINE

## 2024-11-15 PROCEDURE — 3074F SYST BP LT 130 MM HG: CPT | Performed by: FAMILY MEDICINE

## 2024-11-15 PROCEDURE — 4010F ACE/ARB THERAPY RXD/TAKEN: CPT | Performed by: FAMILY MEDICINE

## 2024-11-15 PROCEDURE — 3078F DIAST BP <80 MM HG: CPT | Performed by: FAMILY MEDICINE

## 2024-11-15 RX ORDER — TIRZEPATIDE 15 MG/.5ML
15 INJECTION, SOLUTION SUBCUTANEOUS WEEKLY
Qty: 2 ML | Refills: 1 | Status: SHIPPED | OUTPATIENT
Start: 2024-11-15 | End: 2025-01-14

## 2024-11-15 ASSESSMENT — ENCOUNTER SYMPTOMS: CONSTITUTIONAL NEGATIVE: 1

## 2024-11-15 NOTE — PROGRESS NOTES
Subjective   Patient ID: Smitha Frank is a 54 y.o. female who presents for No chief complaint on file..    Smitha is doing very well, having lost roughly 14lbs since April (6 months). She has been taking Mounjaro 15mg weekly, though supply shortages have meant intermittent use. She feels well overall. Her adherence to our basic goals is quite good. She has breakfast each AM, doesn't eat after 7PM, and walks for thirty minutes most days. She drinks mostly water though has had diet soda on occasion.          Review of Systems   Constitutional: Negative.        Objective   /64   Wt 120 kg (265 lb 9.6 oz)   BMI 43.53 kg/m²  Weight was 279lbs in April 2024  Physical Exam  Constitutional:       Appearance: She is obese.     Pulmonary and cardiovascular exams are normal.     Assessment/Plan :  Doing very well overall. Reenforced basic goals. Refilled tirzepatide. F/U 3 months, with a goal weight under 250lbs.  Diagnoses and all orders for this visit:  Body mass index (BMI) of 45.0 to 49.9 in adult (Multi)  -     tirzepatide (Mounjaro) 15 mg/0.5 mL pen injector; Inject 15 mg under the skin 1 (one) time per week.  Type 2 diabetes mellitus without complication, unspecified whether long term insulin use (Multi)

## 2024-12-16 DIAGNOSIS — I10 HYPERTENSION, UNSPECIFIED TYPE: ICD-10-CM

## 2024-12-17 RX ORDER — POTASSIUM CHLORIDE 20 MEQ/1
20 TABLET, EXTENDED RELEASE ORAL DAILY
Qty: 30 TABLET | Refills: 0 | Status: SHIPPED | OUTPATIENT
Start: 2024-12-17

## 2024-12-20 DIAGNOSIS — J40 BRONCHITIS: ICD-10-CM

## 2024-12-25 RX ORDER — MONTELUKAST SODIUM 10 MG/1
10 TABLET ORAL NIGHTLY
Qty: 30 TABLET | Refills: 0 | Status: SHIPPED | OUTPATIENT
Start: 2024-12-25

## 2025-02-06 DIAGNOSIS — I10 PRIMARY HYPERTENSION: ICD-10-CM

## 2025-02-07 RX ORDER — LOSARTAN POTASSIUM 100 MG/1
100 TABLET ORAL DAILY
Qty: 30 TABLET | Refills: 0 | Status: SHIPPED | OUTPATIENT
Start: 2025-02-07

## 2025-02-12 ENCOUNTER — HOSPITAL ENCOUNTER (INPATIENT)
Facility: HOSPITAL | Age: 55
LOS: 3 days | Discharge: HOME | DRG: 309 | End: 2025-02-15
Attending: EMERGENCY MEDICINE | Admitting: INTERNAL MEDICINE
Payer: MEDICARE

## 2025-02-12 ENCOUNTER — APPOINTMENT (OUTPATIENT)
Dept: RADIOLOGY | Facility: HOSPITAL | Age: 55
DRG: 309 | End: 2025-02-12
Payer: MEDICARE

## 2025-02-12 DIAGNOSIS — M87.00 AVN (AVASCULAR NECROSIS OF BONE) (MULTI): ICD-10-CM

## 2025-02-12 DIAGNOSIS — N63.10 MASS OF RIGHT BREAST, UNSPECIFIED QUADRANT: ICD-10-CM

## 2025-02-12 DIAGNOSIS — E04.1 THYROID NODULE: ICD-10-CM

## 2025-02-12 DIAGNOSIS — I48.92 ATRIAL FLUTTER WITH RAPID VENTRICULAR RESPONSE (MULTI): Primary | ICD-10-CM

## 2025-02-12 LAB
ABO GROUP (TYPE) IN BLOOD: NORMAL
ABO GROUP (TYPE) IN BLOOD: NORMAL
ALBUMIN SERPL BCP-MCNC: 4.3 G/DL (ref 3.4–5)
ALBUMIN SERPL BCP-MCNC: 4.3 G/DL (ref 3.4–5)
ALP SERPL-CCNC: 74 U/L (ref 33–110)
ALP SERPL-CCNC: 74 U/L (ref 33–110)
ALT SERPL W P-5'-P-CCNC: 12 U/L (ref 7–45)
ALT SERPL W P-5'-P-CCNC: 12 U/L (ref 7–45)
ANION GAP BLDV CALCULATED.4IONS-SCNC: 10 MMOL/L (ref 10–25)
ANION GAP BLDV CALCULATED.4IONS-SCNC: 10 MMOL/L (ref 10–25)
ANION GAP SERPL CALC-SCNC: 13 MMOL/L (ref 10–20)
ANION GAP SERPL CALC-SCNC: 13 MMOL/L (ref 10–20)
ANTIBODY SCREEN: NORMAL
ANTIBODY SCREEN: NORMAL
AST SERPL W P-5'-P-CCNC: 22 U/L (ref 9–39)
AST SERPL W P-5'-P-CCNC: 22 U/L (ref 9–39)
BASE EXCESS BLDV CALC-SCNC: 4.4 MMOL/L (ref -2–3)
BASE EXCESS BLDV CALC-SCNC: 4.4 MMOL/L (ref -2–3)
BASOPHILS # BLD MANUAL: 0.09 X10*3/UL (ref 0–0.1)
BASOPHILS # BLD MANUAL: 0.09 X10*3/UL (ref 0–0.1)
BASOPHILS NFR BLD MANUAL: 1 %
BASOPHILS NFR BLD MANUAL: 1 %
BILIRUB SERPL-MCNC: 0.4 MG/DL (ref 0–1.2)
BILIRUB SERPL-MCNC: 0.4 MG/DL (ref 0–1.2)
BODY TEMPERATURE: 37 DEGREES CELSIUS
BODY TEMPERATURE: 37 DEGREES CELSIUS
BUN SERPL-MCNC: 15 MG/DL (ref 6–23)
BUN SERPL-MCNC: 15 MG/DL (ref 6–23)
CA-I BLDV-SCNC: 1.18 MMOL/L (ref 1.1–1.33)
CA-I BLDV-SCNC: 1.18 MMOL/L (ref 1.1–1.33)
CALCIUM SERPL-MCNC: 9.7 MG/DL (ref 8.6–10.6)
CALCIUM SERPL-MCNC: 9.7 MG/DL (ref 8.6–10.6)
CARDIAC TROPONIN I PNL SERPL HS: 3 NG/L (ref 0–34)
CHLORIDE BLDV-SCNC: 107 MMOL/L (ref 98–107)
CHLORIDE BLDV-SCNC: 107 MMOL/L (ref 98–107)
CHLORIDE SERPL-SCNC: 106 MMOL/L (ref 98–107)
CHLORIDE SERPL-SCNC: 106 MMOL/L (ref 98–107)
CO2 SERPL-SCNC: 26 MMOL/L (ref 21–32)
CO2 SERPL-SCNC: 26 MMOL/L (ref 21–32)
CREAT SERPL-MCNC: 0.94 MG/DL (ref 0.5–1.05)
CREAT SERPL-MCNC: 0.94 MG/DL (ref 0.5–1.05)
EGFRCR SERPLBLD CKD-EPI 2021: 71 ML/MIN/1.73M*2
EGFRCR SERPLBLD CKD-EPI 2021: 71 ML/MIN/1.73M*2
EOSINOPHIL # BLD MANUAL: 0 X10*3/UL (ref 0–0.7)
EOSINOPHIL # BLD MANUAL: 0 X10*3/UL (ref 0–0.7)
EOSINOPHIL NFR BLD MANUAL: 0 %
EOSINOPHIL NFR BLD MANUAL: 0 %
ERYTHROCYTE [DISTWIDTH] IN BLOOD BY AUTOMATED COUNT: 14.4 % (ref 11.5–14.5)
ERYTHROCYTE [DISTWIDTH] IN BLOOD BY AUTOMATED COUNT: 14.4 % (ref 11.5–14.5)
GLUCOSE BLDV-MCNC: 99 MG/DL (ref 74–99)
GLUCOSE BLDV-MCNC: 99 MG/DL (ref 74–99)
GLUCOSE SERPL-MCNC: 84 MG/DL (ref 74–99)
GLUCOSE SERPL-MCNC: 84 MG/DL (ref 74–99)
HCO3 BLDV-SCNC: 29.2 MMOL/L (ref 22–26)
HCO3 BLDV-SCNC: 29.2 MMOL/L (ref 22–26)
HCT VFR BLD AUTO: 41.7 % (ref 36–46)
HCT VFR BLD AUTO: 41.7 % (ref 36–46)
HCT VFR BLD EST: 41 % (ref 36–46)
HCT VFR BLD EST: 41 % (ref 36–46)
HGB BLD-MCNC: 13.4 G/DL (ref 12–16)
HGB BLD-MCNC: 13.4 G/DL (ref 12–16)
HGB BLDV-MCNC: 13.5 G/DL (ref 12–16)
HGB BLDV-MCNC: 13.5 G/DL (ref 12–16)
IMM GRANULOCYTES # BLD AUTO: 0.03 X10*3/UL (ref 0–0.7)
IMM GRANULOCYTES # BLD AUTO: 0.03 X10*3/UL (ref 0–0.7)
IMM GRANULOCYTES NFR BLD AUTO: 0.3 % (ref 0–0.9)
IMM GRANULOCYTES NFR BLD AUTO: 0.3 % (ref 0–0.9)
INR PPP: 1.1 (ref 0.9–1.1)
INR PPP: 1.1 (ref 0.9–1.1)
LACTATE BLDV-SCNC: 1.1 MMOL/L (ref 0.4–2)
LACTATE BLDV-SCNC: 1.1 MMOL/L (ref 0.4–2)
LACTATE SERPL-SCNC: 1 MMOL/L (ref 0.4–2)
LACTATE SERPL-SCNC: 1 MMOL/L (ref 0.4–2)
LYMPHOCYTES # BLD MANUAL: 3.2 X10*3/UL (ref 1.2–4.8)
LYMPHOCYTES # BLD MANUAL: 3.2 X10*3/UL (ref 1.2–4.8)
LYMPHOCYTES NFR BLD MANUAL: 34 %
LYMPHOCYTES NFR BLD MANUAL: 34 %
MAGNESIUM SERPL-MCNC: 2.3 MG/DL (ref 1.6–2.4)
MAGNESIUM SERPL-MCNC: 2.3 MG/DL (ref 1.6–2.4)
MCH RBC QN AUTO: 27.2 PG (ref 26–34)
MCH RBC QN AUTO: 27.2 PG (ref 26–34)
MCHC RBC AUTO-ENTMCNC: 32.1 G/DL (ref 32–36)
MCHC RBC AUTO-ENTMCNC: 32.1 G/DL (ref 32–36)
MCV RBC AUTO: 85 FL (ref 80–100)
MCV RBC AUTO: 85 FL (ref 80–100)
MONOCYTES # BLD MANUAL: 0.38 X10*3/UL (ref 0.1–1)
MONOCYTES # BLD MANUAL: 0.38 X10*3/UL (ref 0.1–1)
MONOCYTES NFR BLD MANUAL: 4 %
MONOCYTES NFR BLD MANUAL: 4 %
NEUTS SEG # BLD MANUAL: 5.55 X10*3/UL (ref 1.2–7)
NEUTS SEG # BLD MANUAL: 5.55 X10*3/UL (ref 1.2–7)
NEUTS SEG NFR BLD MANUAL: 59 %
NEUTS SEG NFR BLD MANUAL: 59 %
NRBC BLD-RTO: 0 /100 WBCS (ref 0–0)
NRBC BLD-RTO: 0 /100 WBCS (ref 0–0)
OXYHGB MFR BLDV: 37.8 % (ref 45–75)
OXYHGB MFR BLDV: 37.8 % (ref 45–75)
PCO2 BLDV: 43 MM HG (ref 41–51)
PCO2 BLDV: 43 MM HG (ref 41–51)
PH BLDV: 7.44 PH (ref 7.33–7.43)
PH BLDV: 7.44 PH (ref 7.33–7.43)
PHOSPHATE SERPL-MCNC: 3.5 MG/DL (ref 2.5–4.9)
PHOSPHATE SERPL-MCNC: 3.5 MG/DL (ref 2.5–4.9)
PLATELET # BLD AUTO: 254 X10*3/UL (ref 150–450)
PLATELET # BLD AUTO: 254 X10*3/UL (ref 150–450)
PO2 BLDV: 25 MM HG (ref 35–45)
PO2 BLDV: 25 MM HG (ref 35–45)
POTASSIUM BLDV-SCNC: 4.7 MMOL/L (ref 3.5–5.3)
POTASSIUM BLDV-SCNC: 4.7 MMOL/L (ref 3.5–5.3)
POTASSIUM SERPL-SCNC: 4.2 MMOL/L (ref 3.5–5.3)
POTASSIUM SERPL-SCNC: 4.2 MMOL/L (ref 3.5–5.3)
PROT SERPL-MCNC: 7.5 G/DL (ref 6.4–8.2)
PROT SERPL-MCNC: 7.5 G/DL (ref 6.4–8.2)
PROTHROMBIN TIME: 12.5 SECONDS (ref 9.8–12.8)
PROTHROMBIN TIME: 12.5 SECONDS (ref 9.8–12.8)
RBC # BLD AUTO: 4.93 X10*6/UL (ref 4–5.2)
RBC # BLD AUTO: 4.93 X10*6/UL (ref 4–5.2)
RBC MORPH BLD: NORMAL
RBC MORPH BLD: NORMAL
RH FACTOR (ANTIGEN D): NORMAL
RH FACTOR (ANTIGEN D): NORMAL
SAO2 % BLDV: 38 % (ref 45–75)
SAO2 % BLDV: 38 % (ref 45–75)
SODIUM BLDV-SCNC: 141 MMOL/L (ref 136–145)
SODIUM BLDV-SCNC: 141 MMOL/L (ref 136–145)
SODIUM SERPL-SCNC: 141 MMOL/L (ref 136–145)
SODIUM SERPL-SCNC: 141 MMOL/L (ref 136–145)
TARGETS BLD QL SMEAR: NORMAL
TARGETS BLD QL SMEAR: NORMAL
TOTAL CELLS COUNTED BLD: 100
TOTAL CELLS COUNTED BLD: 100
TSH SERPL-ACNC: 2.95 MIU/L (ref 0.44–3.98)
TSH SERPL-ACNC: 2.95 MIU/L (ref 0.44–3.98)
VARIANT LYMPHS # BLD MANUAL: 0.19 X10*3/UL (ref 0–0.5)
VARIANT LYMPHS # BLD MANUAL: 0.19 X10*3/UL (ref 0–0.5)
VARIANT LYMPHS NFR BLD: 2 %
VARIANT LYMPHS NFR BLD: 2 %
WBC # BLD AUTO: 9.4 X10*3/UL (ref 4.4–11.3)
WBC # BLD AUTO: 9.4 X10*3/UL (ref 4.4–11.3)

## 2025-02-12 PROCEDURE — 96374 THER/PROPH/DIAG INJ IV PUSH: CPT

## 2025-02-12 PROCEDURE — 72131 CT LUMBAR SPINE W/O DYE: CPT | Mod: RCN

## 2025-02-12 PROCEDURE — 70450 CT HEAD/BRAIN W/O DYE: CPT | Performed by: STUDENT IN AN ORGANIZED HEALTH CARE EDUCATION/TRAINING PROGRAM

## 2025-02-12 PROCEDURE — 36415 COLL VENOUS BLD VENIPUNCTURE: CPT | Performed by: EMERGENCY MEDICINE

## 2025-02-12 PROCEDURE — 99223 1ST HOSP IP/OBS HIGH 75: CPT

## 2025-02-12 PROCEDURE — 72128 CT CHEST SPINE W/O DYE: CPT | Performed by: STUDENT IN AN ORGANIZED HEALTH CARE EDUCATION/TRAINING PROGRAM

## 2025-02-12 PROCEDURE — 2500000004 HC RX 250 GENERAL PHARMACY W/ HCPCS (ALT 636 FOR OP/ED)

## 2025-02-12 PROCEDURE — 82550 ASSAY OF CK (CPK): CPT

## 2025-02-12 PROCEDURE — 36415 COLL VENOUS BLD VENIPUNCTURE: CPT

## 2025-02-12 PROCEDURE — 99292 CRITICAL CARE ADDL 30 MIN: CPT | Mod: 25 | Performed by: EMERGENCY MEDICINE

## 2025-02-12 PROCEDURE — 82077 ASSAY SPEC XCP UR&BREATH IA: CPT | Performed by: EMERGENCY MEDICINE

## 2025-02-12 PROCEDURE — 84100 ASSAY OF PHOSPHORUS: CPT

## 2025-02-12 PROCEDURE — 96365 THER/PROPH/DIAG IV INF INIT: CPT

## 2025-02-12 PROCEDURE — 72131 CT LUMBAR SPINE W/O DYE: CPT | Performed by: STUDENT IN AN ORGANIZED HEALTH CARE EDUCATION/TRAINING PROGRAM

## 2025-02-12 PROCEDURE — 80053 COMPREHEN METABOLIC PANEL: CPT | Performed by: EMERGENCY MEDICINE

## 2025-02-12 PROCEDURE — 2500000004 HC RX 250 GENERAL PHARMACY W/ HCPCS (ALT 636 FOR OP/ED): Performed by: EMERGENCY MEDICINE

## 2025-02-12 PROCEDURE — 84132 ASSAY OF SERUM POTASSIUM: CPT

## 2025-02-12 PROCEDURE — 99221 1ST HOSP IP/OBS SF/LOW 40: CPT | Performed by: SURGERY

## 2025-02-12 PROCEDURE — 71045 X-RAY EXAM CHEST 1 VIEW: CPT | Performed by: STUDENT IN AN ORGANIZED HEALTH CARE EDUCATION/TRAINING PROGRAM

## 2025-02-12 PROCEDURE — 71250 CT THORAX DX C-: CPT | Performed by: STUDENT IN AN ORGANIZED HEALTH CARE EDUCATION/TRAINING PROGRAM

## 2025-02-12 PROCEDURE — 72128 CT CHEST SPINE W/O DYE: CPT | Mod: RCN

## 2025-02-12 PROCEDURE — 2550000001 HC RX 255 CONTRASTS: Performed by: EMERGENCY MEDICINE

## 2025-02-12 PROCEDURE — 71260 CT THORAX DX C+: CPT

## 2025-02-12 PROCEDURE — 84484 ASSAY OF TROPONIN QUANT: CPT

## 2025-02-12 PROCEDURE — 84443 ASSAY THYROID STIM HORMONE: CPT | Performed by: EMERGENCY MEDICINE

## 2025-02-12 PROCEDURE — 85027 COMPLETE CBC AUTOMATED: CPT | Performed by: EMERGENCY MEDICINE

## 2025-02-12 PROCEDURE — 1210000001 HC SEMI-PRIVATE ROOM DAILY

## 2025-02-12 PROCEDURE — 99292 CRITICAL CARE ADDL 30 MIN: CPT | Performed by: EMERGENCY MEDICINE

## 2025-02-12 PROCEDURE — 71045 X-RAY EXAM CHEST 1 VIEW: CPT

## 2025-02-12 PROCEDURE — 72125 CT NECK SPINE W/O DYE: CPT

## 2025-02-12 PROCEDURE — 83880 ASSAY OF NATRIURETIC PEPTIDE: CPT

## 2025-02-12 PROCEDURE — 96375 TX/PRO/DX INJ NEW DRUG ADDON: CPT

## 2025-02-12 PROCEDURE — 93010 ELECTROCARDIOGRAM REPORT: CPT | Performed by: EMERGENCY MEDICINE

## 2025-02-12 PROCEDURE — 85007 BL SMEAR W/DIFF WBC COUNT: CPT | Performed by: EMERGENCY MEDICINE

## 2025-02-12 PROCEDURE — G0390 TRAUMA RESPONS W/HOSP CRITI: HCPCS

## 2025-02-12 PROCEDURE — 72170 X-RAY EXAM OF PELVIS: CPT

## 2025-02-12 PROCEDURE — 84484 ASSAY OF TROPONIN QUANT: CPT | Performed by: EMERGENCY MEDICINE

## 2025-02-12 PROCEDURE — 86900 BLOOD TYPING SEROLOGIC ABO: CPT | Performed by: EMERGENCY MEDICINE

## 2025-02-12 PROCEDURE — 86850 RBC ANTIBODY SCREEN: CPT | Performed by: EMERGENCY MEDICINE

## 2025-02-12 PROCEDURE — 83735 ASSAY OF MAGNESIUM: CPT | Performed by: EMERGENCY MEDICINE

## 2025-02-12 PROCEDURE — 99291 CRITICAL CARE FIRST HOUR: CPT | Mod: 25 | Performed by: EMERGENCY MEDICINE

## 2025-02-12 PROCEDURE — 82947 ASSAY GLUCOSE BLOOD QUANT: CPT

## 2025-02-12 PROCEDURE — 74176 CT ABD & PELVIS W/O CONTRAST: CPT | Performed by: STUDENT IN AN ORGANIZED HEALTH CARE EDUCATION/TRAINING PROGRAM

## 2025-02-12 PROCEDURE — 83036 HEMOGLOBIN GLYCOSYLATED A1C: CPT

## 2025-02-12 PROCEDURE — 99291 CRITICAL CARE FIRST HOUR: CPT | Performed by: EMERGENCY MEDICINE

## 2025-02-12 PROCEDURE — 85610 PROTHROMBIN TIME: CPT | Performed by: EMERGENCY MEDICINE

## 2025-02-12 PROCEDURE — 72125 CT NECK SPINE W/O DYE: CPT | Performed by: STUDENT IN AN ORGANIZED HEALTH CARE EDUCATION/TRAINING PROGRAM

## 2025-02-12 PROCEDURE — 83605 ASSAY OF LACTIC ACID: CPT | Performed by: EMERGENCY MEDICINE

## 2025-02-12 PROCEDURE — 70450 CT HEAD/BRAIN W/O DYE: CPT

## 2025-02-12 PROCEDURE — 72170 X-RAY EXAM OF PELVIS: CPT | Performed by: STUDENT IN AN ORGANIZED HEALTH CARE EDUCATION/TRAINING PROGRAM

## 2025-02-12 PROCEDURE — 96366 THER/PROPH/DIAG IV INF ADDON: CPT

## 2025-02-12 RX ORDER — ONDANSETRON HYDROCHLORIDE 2 MG/ML
4 INJECTION, SOLUTION INTRAVENOUS ONCE
Status: DISCONTINUED | OUTPATIENT
Start: 2025-02-12 | End: 2025-02-15 | Stop reason: HOSPADM

## 2025-02-12 RX ORDER — HEPARIN SODIUM 10000 [USP'U]/100ML
0-4500 INJECTION, SOLUTION INTRAVENOUS CONTINUOUS
Status: DISCONTINUED | OUTPATIENT
Start: 2025-02-12 | End: 2025-02-13

## 2025-02-12 RX ORDER — DILTIAZEM HCL/D5W 125 MG/125
5-15 PLASTIC BAG, INJECTION (ML) INTRAVENOUS CONTINUOUS
Status: DISCONTINUED | OUTPATIENT
Start: 2025-02-12 | End: 2025-02-13

## 2025-02-12 RX ADMIN — Medication 5 MG/HR: at 23:01

## 2025-02-12 RX ADMIN — HEPARIN SODIUM 2000 UNITS/HR: 10000 INJECTION, SOLUTION INTRAVENOUS at 21:55

## 2025-02-12 RX ADMIN — HYDROMORPHONE HYDROCHLORIDE 0.5 MG: 0.5 INJECTION, SOLUTION INTRAMUSCULAR; INTRAVENOUS; SUBCUTANEOUS at 21:48

## 2025-02-12 RX ADMIN — SODIUM CHLORIDE, SODIUM LACTATE, POTASSIUM CHLORIDE, AND CALCIUM CHLORIDE 1000 ML: 600; 310; 30; 20 INJECTION, SOLUTION INTRAVENOUS at 22:21

## 2025-02-12 RX ADMIN — IOHEXOL 100 ML: 350 INJECTION, SOLUTION INTRAVENOUS at 20:04

## 2025-02-12 ASSESSMENT — ENCOUNTER SYMPTOMS
BACK PAIN: 1
ABDOMINAL PAIN: 1

## 2025-02-13 ENCOUNTER — CLINICAL SUPPORT (OUTPATIENT)
Dept: EMERGENCY MEDICINE | Facility: HOSPITAL | Age: 55
DRG: 309 | End: 2025-02-13
Payer: MEDICARE

## 2025-02-13 ENCOUNTER — APPOINTMENT (OUTPATIENT)
Dept: CARDIOLOGY | Facility: HOSPITAL | Age: 55
DRG: 309 | End: 2025-02-13
Payer: MEDICARE

## 2025-02-13 ENCOUNTER — APPOINTMENT (OUTPATIENT)
Dept: RADIOLOGY | Facility: HOSPITAL | Age: 55
DRG: 309 | End: 2025-02-13
Payer: MEDICARE

## 2025-02-13 LAB
AORTIC VALVE PEAK VELOCITY: 1.6 M/S
AORTIC VALVE PEAK VELOCITY: 1.6 M/S
ATRIAL RATE: 278 BPM
ATRIAL RATE: 278 BPM
AV PEAK GRADIENT: 10 MMHG
AV PEAK GRADIENT: 10 MMHG
AVA (PEAK VEL): 2.16 CM2
AVA (PEAK VEL): 2.16 CM2
BNP SERPL-MCNC: 26 PG/ML (ref 0–99)
BNP SERPL-MCNC: 26 PG/ML (ref 0–99)
CARDIAC TROPONIN I PNL SERPL HS: <3 NG/L (ref 0–34)
CARDIAC TROPONIN I PNL SERPL HS: <3 NG/L (ref 0–34)
CK SERPL-CCNC: 151 U/L (ref 0–215)
CK SERPL-CCNC: 151 U/L (ref 0–215)
EJECTION FRACTION APICAL 4 CHAMBER: 59.2
EJECTION FRACTION APICAL 4 CHAMBER: 59.2
EJECTION FRACTION: 57 %
EJECTION FRACTION: 57 %
EST. AVERAGE GLUCOSE BLD GHB EST-MCNC: 105 MG/DL
EST. AVERAGE GLUCOSE BLD GHB EST-MCNC: 105 MG/DL
ETHANOL SERPL-MCNC: <10 MG/DL
ETHANOL SERPL-MCNC: <10 MG/DL
GLUCOSE BLD MANUAL STRIP-MCNC: 79 MG/DL (ref 74–99)
GLUCOSE BLD MANUAL STRIP-MCNC: 79 MG/DL (ref 74–99)
GLUCOSE BLD MANUAL STRIP-MCNC: 85 MG/DL (ref 74–99)
GLUCOSE BLD MANUAL STRIP-MCNC: 85 MG/DL (ref 74–99)
GLUCOSE BLD MANUAL STRIP-MCNC: 86 MG/DL (ref 74–99)
GLUCOSE BLD MANUAL STRIP-MCNC: 86 MG/DL (ref 74–99)
GLUCOSE BLD MANUAL STRIP-MCNC: 98 MG/DL (ref 74–99)
HBA1C MFR BLD: 5.3 %
HBA1C MFR BLD: 5.3 %
LEFT VENTRICLE INTERNAL DIMENSION DIASTOLE: 3.9 CM (ref 3.5–6)
LEFT VENTRICLE INTERNAL DIMENSION DIASTOLE: 3.9 CM (ref 3.5–6)
LEFT VENTRICULAR OUTFLOW TRACT DIAMETER: 2 CM
LEFT VENTRICULAR OUTFLOW TRACT DIAMETER: 2 CM
MITRAL VALVE E/A RATIO: 2.13
MITRAL VALVE E/A RATIO: 2.13
P AXIS: 267 DEGREES
P AXIS: 267 DEGREES
Q ONSET: 217 MS
Q ONSET: 217 MS
QRS COUNT: 14 BEATS
QRS COUNT: 14 BEATS
QRS DURATION: 90 MS
QRS DURATION: 90 MS
QT INTERVAL: 396 MS
QT INTERVAL: 396 MS
QTC CALCULATION(BAZETT): 462 MS
QTC CALCULATION(BAZETT): 462 MS
QTC FREDERICIA: 439 MS
QTC FREDERICIA: 439 MS
R AXIS: 63 DEGREES
R AXIS: 63 DEGREES
RIGHT VENTRICLE FREE WALL PEAK S': 16.4 CM/S
RIGHT VENTRICLE FREE WALL PEAK S': 16.4 CM/S
RIGHT VENTRICLE PEAK SYSTOLIC PRESSURE: 25.8 MMHG
RIGHT VENTRICLE PEAK SYSTOLIC PRESSURE: 25.8 MMHG
T AXIS: 57 DEGREES
T AXIS: 57 DEGREES
T OFFSET: 415 MS
T OFFSET: 415 MS
TRICUSPID ANNULAR PLANE SYSTOLIC EXCURSION: 1.8 CM
TRICUSPID ANNULAR PLANE SYSTOLIC EXCURSION: 1.8 CM
UFH PPP CHRO-ACNC: 0.3 IU/ML
UFH PPP CHRO-ACNC: 0.3 IU/ML
UFH PPP CHRO-ACNC: 0.5 IU/ML
UFH PPP CHRO-ACNC: 0.5 IU/ML
UFH PPP CHRO-ACNC: 1.7 IU/ML
UFH PPP CHRO-ACNC: 1.7 IU/ML
VENTRICULAR RATE: 82 BPM
VENTRICULAR RATE: 82 BPM

## 2025-02-13 PROCEDURE — 2500000001 HC RX 250 WO HCPCS SELF ADMINISTERED DRUGS (ALT 637 FOR MEDICARE OP)

## 2025-02-13 PROCEDURE — 2500000004 HC RX 250 GENERAL PHARMACY W/ HCPCS (ALT 636 FOR OP/ED)

## 2025-02-13 PROCEDURE — 93005 ELECTROCARDIOGRAM TRACING: CPT

## 2025-02-13 PROCEDURE — 73130 X-RAY EXAM OF HAND: CPT | Mod: BILATERAL PROCEDURE | Performed by: STUDENT IN AN ORGANIZED HEALTH CARE EDUCATION/TRAINING PROGRAM

## 2025-02-13 PROCEDURE — 99223 1ST HOSP IP/OBS HIGH 75: CPT | Performed by: STUDENT IN AN ORGANIZED HEALTH CARE EDUCATION/TRAINING PROGRAM

## 2025-02-13 PROCEDURE — 82947 ASSAY GLUCOSE BLOOD QUANT: CPT

## 2025-02-13 PROCEDURE — C8929 TTE W OR WO FOL WCON,DOPPLER: HCPCS

## 2025-02-13 PROCEDURE — 2500000005 HC RX 250 GENERAL PHARMACY W/O HCPCS

## 2025-02-13 PROCEDURE — 73130 X-RAY EXAM OF HAND: CPT | Mod: 50

## 2025-02-13 PROCEDURE — 36415 COLL VENOUS BLD VENIPUNCTURE: CPT | Performed by: EMERGENCY MEDICINE

## 2025-02-13 PROCEDURE — 36415 COLL VENOUS BLD VENIPUNCTURE: CPT

## 2025-02-13 PROCEDURE — 85520 HEPARIN ASSAY: CPT | Performed by: EMERGENCY MEDICINE

## 2025-02-13 PROCEDURE — 1200000002 HC GENERAL ROOM WITH TELEMETRY DAILY

## 2025-02-13 PROCEDURE — 85520 HEPARIN ASSAY: CPT

## 2025-02-13 PROCEDURE — 84484 ASSAY OF TROPONIN QUANT: CPT

## 2025-02-13 PROCEDURE — 93306 TTE W/DOPPLER COMPLETE: CPT | Performed by: INTERNAL MEDICINE

## 2025-02-13 PROCEDURE — 99222 1ST HOSP IP/OBS MODERATE 55: CPT | Performed by: INTERNAL MEDICINE

## 2025-02-13 RX ORDER — DILTIAZEM HYDROCHLORIDE 30 MG/1
30 TABLET, FILM COATED ORAL EVERY 6 HOURS
Status: DISCONTINUED | OUTPATIENT
Start: 2025-02-13 | End: 2025-02-13

## 2025-02-13 RX ORDER — FLUTICASONE FUROATE AND VILANTEROL TRIFENATATE 100; 25 UG/1; UG/1
1 POWDER RESPIRATORY (INHALATION) DAILY
COMMUNITY
Start: 2025-02-02

## 2025-02-13 RX ORDER — VIT C/E/ZN/COPPR/LUTEIN/ZEAXAN 250MG-90MG
125 CAPSULE ORAL DAILY
COMMUNITY

## 2025-02-13 RX ORDER — MULTIVIT-MIN/IRON FUM/FOLIC AC 7.5 MG-4
1 TABLET ORAL DAILY
COMMUNITY

## 2025-02-13 RX ORDER — ENOXAPARIN SODIUM 100 MG/ML
40 INJECTION SUBCUTANEOUS EVERY 12 HOURS SCHEDULED
Status: CANCELLED | OUTPATIENT
Start: 2025-02-13

## 2025-02-13 RX ORDER — DEXTROSE 50 % IN WATER (D50W) INTRAVENOUS SYRINGE
25
Status: DISCONTINUED | OUTPATIENT
Start: 2025-02-13 | End: 2025-02-15 | Stop reason: HOSPADM

## 2025-02-13 RX ORDER — ALBUTEROL SULFATE 90 UG/1
2 INHALANT RESPIRATORY (INHALATION) EVERY 4 HOURS PRN
Status: DISCONTINUED | OUTPATIENT
Start: 2025-02-13 | End: 2025-02-15 | Stop reason: HOSPADM

## 2025-02-13 RX ORDER — ACETAMINOPHEN 160 MG/5ML
950 SOLUTION ORAL EVERY 6 HOURS PRN
Status: DISCONTINUED | OUTPATIENT
Start: 2025-02-13 | End: 2025-02-15 | Stop reason: HOSPADM

## 2025-02-13 RX ORDER — ATORVASTATIN CALCIUM 20 MG/1
20 TABLET, FILM COATED ORAL NIGHTLY
Status: DISCONTINUED | OUTPATIENT
Start: 2025-02-13 | End: 2025-02-15 | Stop reason: HOSPADM

## 2025-02-13 RX ORDER — POTASSIUM CHLORIDE 1500 MG/1
20 TABLET, EXTENDED RELEASE ORAL DAILY
COMMUNITY
Start: 2025-02-11 | End: 2025-02-24 | Stop reason: WASHOUT

## 2025-02-13 RX ORDER — TALC
3 POWDER (GRAM) TOPICAL NIGHTLY PRN
Status: DISCONTINUED | OUTPATIENT
Start: 2025-02-13 | End: 2025-02-15 | Stop reason: HOSPADM

## 2025-02-13 RX ORDER — POLYETHYLENE GLYCOL 3350 17 G/17G
17 POWDER, FOR SOLUTION ORAL 2 TIMES DAILY
Status: DISCONTINUED | OUTPATIENT
Start: 2025-02-13 | End: 2025-02-15 | Stop reason: HOSPADM

## 2025-02-13 RX ORDER — PANTOPRAZOLE SODIUM 40 MG/1
40 TABLET, DELAYED RELEASE ORAL
Status: DISCONTINUED | OUTPATIENT
Start: 2025-02-13 | End: 2025-02-15 | Stop reason: HOSPADM

## 2025-02-13 RX ORDER — GABAPENTIN 100 MG/1
100 CAPSULE ORAL 3 TIMES DAILY
Status: DISCONTINUED | OUTPATIENT
Start: 2025-02-13 | End: 2025-02-15 | Stop reason: HOSPADM

## 2025-02-13 RX ORDER — MONTELUKAST SODIUM 10 MG/1
10 TABLET ORAL DAILY
COMMUNITY
Start: 2025-01-06 | End: 2025-02-24 | Stop reason: WASHOUT

## 2025-02-13 RX ORDER — GLUCOSAM/CHONDRO/HERB 149/HYAL 750-100 MG
1 TABLET ORAL DAILY
COMMUNITY

## 2025-02-13 RX ORDER — ALBUTEROL SULFATE 90 UG/1
2 INHALANT RESPIRATORY (INHALATION) EVERY 4 HOURS PRN
COMMUNITY
Start: 2024-07-02

## 2025-02-13 RX ORDER — LOSARTAN POTASSIUM 100 MG/1
1 TABLET ORAL
COMMUNITY
Start: 2024-12-04 | End: 2025-02-24 | Stop reason: WASHOUT

## 2025-02-13 RX ORDER — ATORVASTATIN CALCIUM 20 MG/1
1 TABLET, FILM COATED ORAL
COMMUNITY
Start: 2025-01-06

## 2025-02-13 RX ORDER — FLUTICASONE FUROATE AND VILANTEROL 100; 25 UG/1; UG/1
1 POWDER RESPIRATORY (INHALATION) DAILY
Status: DISCONTINUED | OUTPATIENT
Start: 2025-02-13 | End: 2025-02-15 | Stop reason: HOSPADM

## 2025-02-13 RX ORDER — HEPARIN SODIUM 10000 [USP'U]/100ML
0-4000 INJECTION, SOLUTION INTRAVENOUS CONTINUOUS
Status: DISPENSED | OUTPATIENT
Start: 2025-02-13 | End: 2025-02-14

## 2025-02-13 RX ORDER — MONTELUKAST SODIUM 10 MG/1
10 TABLET ORAL NIGHTLY
Status: DISCONTINUED | OUTPATIENT
Start: 2025-02-13 | End: 2025-02-15 | Stop reason: HOSPADM

## 2025-02-13 RX ORDER — LATANOPROST 50 UG/ML
1 SOLUTION/ DROPS OPHTHALMIC NIGHTLY
COMMUNITY

## 2025-02-13 RX ORDER — POLYETHYLENE GLYCOL 3350 17 G/17G
17 POWDER, FOR SOLUTION ORAL DAILY
Status: DISCONTINUED | OUTPATIENT
Start: 2025-02-13 | End: 2025-02-13

## 2025-02-13 RX ORDER — METOPROLOL TARTRATE 50 MG/1
25 TABLET ORAL EVERY 6 HOURS
Status: DISCONTINUED | OUTPATIENT
Start: 2025-02-13 | End: 2025-02-15 | Stop reason: HOSPADM

## 2025-02-13 RX ORDER — ALBUTEROL SULFATE 0.83 MG/ML
2.5 SOLUTION RESPIRATORY (INHALATION) EVERY 4 HOURS PRN
COMMUNITY
Start: 2025-01-06

## 2025-02-13 RX ORDER — METFORMIN HYDROCHLORIDE 1000 MG/1
1000 TABLET ORAL
COMMUNITY
Start: 2025-01-06

## 2025-02-13 RX ORDER — LATANOPROST 50 UG/ML
1 SOLUTION/ DROPS OPHTHALMIC NIGHTLY
Status: DISCONTINUED | OUTPATIENT
Start: 2025-02-13 | End: 2025-02-15 | Stop reason: HOSPADM

## 2025-02-13 RX ORDER — INSULIN LISPRO 100 [IU]/ML
0-5 INJECTION, SOLUTION INTRAVENOUS; SUBCUTANEOUS
Status: DISCONTINUED | OUTPATIENT
Start: 2025-02-13 | End: 2025-02-15 | Stop reason: HOSPADM

## 2025-02-13 RX ORDER — CHOLECALCIFEROL (VITAMIN D3) 25 MCG
10000 TABLET ORAL DAILY
Status: DISCONTINUED | OUTPATIENT
Start: 2025-02-13 | End: 2025-02-15 | Stop reason: HOSPADM

## 2025-02-13 RX ORDER — PANTOPRAZOLE SODIUM 40 MG/10ML
40 INJECTION, POWDER, LYOPHILIZED, FOR SOLUTION INTRAVENOUS
Status: DISCONTINUED | OUTPATIENT
Start: 2025-02-13 | End: 2025-02-15 | Stop reason: HOSPADM

## 2025-02-13 RX ORDER — TIRZEPATIDE 15 MG/.5ML
15 INJECTION, SOLUTION SUBCUTANEOUS WEEKLY
COMMUNITY
Start: 2025-01-25

## 2025-02-13 RX ORDER — DEXTROSE 50 % IN WATER (D50W) INTRAVENOUS SYRINGE
12.5
Status: DISCONTINUED | OUTPATIENT
Start: 2025-02-13 | End: 2025-02-15 | Stop reason: HOSPADM

## 2025-02-13 RX ORDER — LIDOCAINE 560 MG/1
1 PATCH PERCUTANEOUS; TOPICAL; TRANSDERMAL EVERY 24 HOURS
Status: DISCONTINUED | OUTPATIENT
Start: 2025-02-13 | End: 2025-02-15 | Stop reason: HOSPADM

## 2025-02-13 RX ORDER — ACETAMINOPHEN 650 MG/1
950 SUPPOSITORY RECTAL EVERY 6 HOURS PRN
Status: DISCONTINUED | OUTPATIENT
Start: 2025-02-13 | End: 2025-02-15 | Stop reason: HOSPADM

## 2025-02-13 RX ORDER — ACETAMINOPHEN 325 MG/1
950 TABLET ORAL EVERY 6 HOURS PRN
Status: DISCONTINUED | OUTPATIENT
Start: 2025-02-13 | End: 2025-02-15 | Stop reason: HOSPADM

## 2025-02-13 RX ORDER — METOPROLOL TARTRATE 25 MG/1
12.5 TABLET, FILM COATED ORAL 2 TIMES DAILY
Status: DISCONTINUED | OUTPATIENT
Start: 2025-02-13 | End: 2025-02-13

## 2025-02-13 RX ADMIN — GABAPENTIN 100 MG: 100 CAPSULE ORAL at 14:05

## 2025-02-13 RX ADMIN — HYDROMORPHONE HYDROCHLORIDE 0.4 MG: 0.5 INJECTION, SOLUTION INTRAMUSCULAR; INTRAVENOUS; SUBCUTANEOUS at 10:29

## 2025-02-13 RX ADMIN — FLUTICASONE FUROATE AND VILANTEROL TRIFENATATE 1 PUFF: 100; 25 POWDER RESPIRATORY (INHALATION) at 08:31

## 2025-02-13 RX ADMIN — METOPROLOL TARTRATE 12.5 MG: 25 TABLET, FILM COATED ORAL at 03:27

## 2025-02-13 RX ADMIN — HEPARIN SODIUM AND DEXTROSE 1000 UNITS/HR: 10000; 5 INJECTION INTRAVENOUS at 06:46

## 2025-02-13 RX ADMIN — HYDROMORPHONE HYDROCHLORIDE 0.2 MG: 0.5 INJECTION, SOLUTION INTRAMUSCULAR; INTRAVENOUS; SUBCUTANEOUS at 03:29

## 2025-02-13 RX ADMIN — POLYETHYLENE GLYCOL 3350 17 G: 17 POWDER, FOR SOLUTION ORAL at 08:32

## 2025-02-13 RX ADMIN — PERFLUTREN 1 ML OF DILUTION: 6.52 INJECTION, SUSPENSION INTRAVENOUS at 15:55

## 2025-02-13 RX ADMIN — HYDROMORPHONE HYDROCHLORIDE 0.4 MG: 0.5 INJECTION, SOLUTION INTRAMUSCULAR; INTRAVENOUS; SUBCUTANEOUS at 20:21

## 2025-02-13 RX ADMIN — CHOLECALCIFEROL TAB 125 MCG (5000 UNIT) 10000 UNITS: 125 TAB at 08:32

## 2025-02-13 RX ADMIN — GABAPENTIN 100 MG: 100 CAPSULE ORAL at 08:32

## 2025-02-13 RX ADMIN — POLYETHYLENE GLYCOL 3350 17 G: 17 POWDER, FOR SOLUTION ORAL at 20:21

## 2025-02-13 RX ADMIN — METOPROLOL TARTRATE 25 MG: 50 TABLET, FILM COATED ORAL at 14:05

## 2025-02-13 RX ADMIN — LIDOCAINE 1 PATCH: 4 PATCH TOPICAL at 03:28

## 2025-02-13 RX ADMIN — MONTELUKAST 10 MG: 10 TABLET, FILM COATED ORAL at 03:32

## 2025-02-13 RX ADMIN — HEPARIN SODIUM AND DEXTROSE 1000 UNITS/HR: 10000; 5 INJECTION INTRAVENOUS at 02:15

## 2025-02-13 RX ADMIN — PANTOPRAZOLE SODIUM 40 MG: 40 TABLET, DELAYED RELEASE ORAL at 08:32

## 2025-02-13 RX ADMIN — GABAPENTIN 100 MG: 100 CAPSULE ORAL at 20:21

## 2025-02-13 RX ADMIN — MONTELUKAST 10 MG: 10 TABLET, FILM COATED ORAL at 20:21

## 2025-02-13 RX ADMIN — ATORVASTATIN CALCIUM 20 MG: 20 TABLET, FILM COATED ORAL at 20:21

## 2025-02-13 RX ADMIN — ACETAMINOPHEN 975 MG: 325 TABLET ORAL at 17:25

## 2025-02-13 RX ADMIN — HEPARIN SODIUM AND DEXTROSE 1000 UNITS/HR: 10000; 5 INJECTION INTRAVENOUS at 12:10

## 2025-02-13 RX ADMIN — METOPROLOL TARTRATE 25 MG: 50 TABLET, FILM COATED ORAL at 20:21

## 2025-02-13 SDOH — SOCIAL STABILITY: SOCIAL INSECURITY
WITHIN THE LAST YEAR, HAVE YOU BEEN KICKED, HIT, SLAPPED, OR OTHERWISE PHYSICALLY HURT BY YOUR PARTNER OR EX-PARTNER?: NO

## 2025-02-13 SDOH — ECONOMIC STABILITY: FOOD INSECURITY: WITHIN THE PAST 12 MONTHS, YOU WORRIED THAT YOUR FOOD WOULD RUN OUT BEFORE YOU GOT THE MONEY TO BUY MORE.: NEVER TRUE

## 2025-02-13 SDOH — ECONOMIC STABILITY: FOOD INSECURITY: WITHIN THE PAST 12 MONTHS, THE FOOD YOU BOUGHT JUST DIDN'T LAST AND YOU DIDN'T HAVE MONEY TO GET MORE.: NEVER TRUE

## 2025-02-13 SDOH — SOCIAL STABILITY: SOCIAL INSECURITY: WITHIN THE LAST YEAR, HAVE YOU BEEN AFRAID OF YOUR PARTNER OR EX-PARTNER?: NO

## 2025-02-13 SDOH — SOCIAL STABILITY: SOCIAL INSECURITY
WITHIN THE LAST YEAR, HAVE YOU BEEN RAPED OR FORCED TO HAVE ANY KIND OF SEXUAL ACTIVITY BY YOUR PARTNER OR EX-PARTNER?: NO

## 2025-02-13 SDOH — SOCIAL STABILITY: SOCIAL INSECURITY: WITHIN THE LAST YEAR, HAVE YOU BEEN HUMILIATED OR EMOTIONALLY ABUSED IN OTHER WAYS BY YOUR PARTNER OR EX-PARTNER?: NO

## 2025-02-13 SDOH — SOCIAL STABILITY: SOCIAL INSECURITY: HAS ANYONE EVER THREATENED TO HURT YOUR FAMILY OR YOUR PETS?: NO

## 2025-02-13 SDOH — SOCIAL STABILITY: SOCIAL INSECURITY: DO YOU FEEL UNSAFE GOING BACK TO THE PLACE WHERE YOU ARE LIVING?: NO

## 2025-02-13 SDOH — ECONOMIC STABILITY: INCOME INSECURITY: IN THE PAST 12 MONTHS HAS THE ELECTRIC, GAS, OIL, OR WATER COMPANY THREATENED TO SHUT OFF SERVICES IN YOUR HOME?: NO

## 2025-02-13 SDOH — SOCIAL STABILITY: SOCIAL INSECURITY: DOES ANYONE TRY TO KEEP YOU FROM HAVING/CONTACTING OTHER FRIENDS OR DOING THINGS OUTSIDE YOUR HOME?: NO

## 2025-02-13 SDOH — SOCIAL STABILITY: SOCIAL INSECURITY: HAVE YOU HAD THOUGHTS OF HARMING ANYONE ELSE?: NO

## 2025-02-13 SDOH — SOCIAL STABILITY: SOCIAL INSECURITY: ARE YOU OR HAVE YOU BEEN THREATENED OR ABUSED PHYSICALLY, EMOTIONALLY, OR SEXUALLY BY ANYONE?: NO

## 2025-02-13 SDOH — SOCIAL STABILITY: SOCIAL INSECURITY: DO YOU FEEL ANYONE HAS EXPLOITED OR TAKEN ADVANTAGE OF YOU FINANCIALLY OR OF YOUR PERSONAL PROPERTY?: NO

## 2025-02-13 SDOH — SOCIAL STABILITY: SOCIAL INSECURITY: ABUSE: ADULT

## 2025-02-13 SDOH — SOCIAL STABILITY: SOCIAL INSECURITY: ARE THERE ANY APPARENT SIGNS OF INJURIES/BEHAVIORS THAT COULD BE RELATED TO ABUSE/NEGLECT?: NO

## 2025-02-13 SDOH — SOCIAL STABILITY: SOCIAL INSECURITY: HAVE YOU HAD ANY THOUGHTS OF HARMING ANYONE ELSE?: NO

## 2025-02-13 ASSESSMENT — ACTIVITIES OF DAILY LIVING (ADL)
JUDGMENT_ADEQUATE_SAFELY_COMPLETE_DAILY_ACTIVITIES: YES
PATIENT'S MEMORY ADEQUATE TO SAFELY COMPLETE DAILY ACTIVITIES?: YES
BATHING: INDEPENDENT
TOILETING: INDEPENDENT
LACK_OF_TRANSPORTATION: NO
ADEQUATE_TO_COMPLETE_ADL: YES
DRESSING YOURSELF: INDEPENDENT
LACK_OF_TRANSPORTATION: NO
HEARING - LEFT EAR: FUNCTIONAL
FEEDING YOURSELF: INDEPENDENT
HEARING - RIGHT EAR: FUNCTIONAL
GROOMING: INDEPENDENT
WALKS IN HOME: INDEPENDENT

## 2025-02-13 ASSESSMENT — CHA2DS2 SCORE
SEX: FEMALE
CHA2D2S VASC SCORE: 4
PRIOR STROKE OR TIA OR THROMBOEMBOLISM: NO
AGE IN YEARS: <65
HYPERTENSION: YES
CHF OR LEFT VENTRICULAR DYSFUNCTION: YES
VASCULAR DISEASE: NO
DIABETES: YES

## 2025-02-13 ASSESSMENT — COGNITIVE AND FUNCTIONAL STATUS - GENERAL
DAILY ACTIVITIY SCORE: 24
PATIENT BASELINE BEDBOUND: NO
MOBILITY SCORE: 24

## 2025-02-13 ASSESSMENT — PAIN SCALES - GENERAL
PAINLEVEL_OUTOF10: 3
PAINLEVEL_OUTOF10: 6
PAINLEVEL_OUTOF10: 7
PAINLEVEL_OUTOF10: 9
PAINLEVEL_OUTOF10: 0 - NO PAIN

## 2025-02-13 ASSESSMENT — PAIN DESCRIPTION - LOCATION
LOCATION: ABDOMEN
LOCATION: ABDOMEN

## 2025-02-13 ASSESSMENT — LIFESTYLE VARIABLES
SKIP TO QUESTIONS 9-10: 1
AUDIT-C TOTAL SCORE: 1
HOW MANY STANDARD DRINKS CONTAINING ALCOHOL DO YOU HAVE ON A TYPICAL DAY: 1 OR 2
HOW OFTEN DO YOU HAVE 6 OR MORE DRINKS ON ONE OCCASION: NEVER
HOW OFTEN DO YOU HAVE A DRINK CONTAINING ALCOHOL: MONTHLY OR LESS
AUDIT-C TOTAL SCORE: 1

## 2025-02-13 ASSESSMENT — PAIN DESCRIPTION - DESCRIPTORS
DESCRIPTORS: ACHING
DESCRIPTORS: ACHING
DESCRIPTORS: SORE;ACHING

## 2025-02-13 ASSESSMENT — PAIN - FUNCTIONAL ASSESSMENT
PAIN_FUNCTIONAL_ASSESSMENT: 0-10

## 2025-02-13 ASSESSMENT — PATIENT HEALTH QUESTIONNAIRE - PHQ9
SUM OF ALL RESPONSES TO PHQ9 QUESTIONS 1 & 2: 0
2. FEELING DOWN, DEPRESSED OR HOPELESS: NOT AT ALL
1. LITTLE INTEREST OR PLEASURE IN DOING THINGS: NOT AT ALL

## 2025-02-13 ASSESSMENT — PAIN DESCRIPTION - ORIENTATION: ORIENTATION: LEFT
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
## 2025-02-13 NOTE — ED PROCEDURE NOTE
Procedure  Critical Care    Performed by: Saturnino Lou MD  Authorized by: Saturnino Lou MD    Critical care provider statement:     Critical care time (minutes):  80    Critical care time was exclusive of:  Separately billable procedures and treating other patients and teaching time    Critical care was necessary to treat or prevent imminent or life-threatening deterioration of the following conditions:  Trauma (cardiac dysrhthmia requiring intravenous agents and continuous monitoring, close rechecks, and continuous infusion anticoagulation)    Critical care was time spent personally by me on the following activities:  Ordering and performing treatments and interventions, ordering and review of laboratory studies, ordering and review of radiographic studies, pulse oximetry, re-evaluation of patient's condition, review of old charts, obtaining history from patient or surrogate, examination of patient, evaluation of patient's response to treatment, discussions with consultants and development of treatment plan with patient or surrogate               Saturnino Lou MD  02/12/25 [id number]

## 2025-02-13 NOTE — CARE PLAN
Problem: Pain - Adult  Goal: Verbalizes/displays adequate comfort level or baseline comfort level  Outcome: Progressing     Problem: Safety - Adult  Goal: Free from fall injury  Outcome: Progressing     Problem: Discharge Planning  Goal: Discharge to home or other facility with appropriate resources  Outcome: Progressing     Problem: Chronic Conditions and Co-morbidities  Goal: Patient's chronic conditions and co-morbidity symptoms are monitored and maintained or improved  Outcome: Progressing     Problem: Nutrition  Goal: Nutrient intake appropriate for maintaining nutritional needs  Outcome: Progressing     Patient admitted to LT 5 from ED. Had echo done. Therapeutic on heparin drip. No c/o chest pain or SOB. Plan is for NPO at midnight for CT watchman tomorrow.
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
## 2025-02-14 ENCOUNTER — ANESTHESIA EVENT (OUTPATIENT)
Dept: CARDIOLOGY | Facility: HOSPITAL | Age: 55
End: 2025-02-14
Payer: MEDICARE

## 2025-02-14 ENCOUNTER — APPOINTMENT (OUTPATIENT)
Dept: RADIOLOGY | Facility: HOSPITAL | Age: 55
DRG: 309 | End: 2025-02-14
Payer: MEDICARE

## 2025-02-14 ENCOUNTER — HOSPITAL ENCOUNTER (OUTPATIENT)
Dept: CARDIOLOGY | Facility: HOSPITAL | Age: 55
Discharge: HOME | DRG: 309 | End: 2025-02-14
Payer: MEDICARE

## 2025-02-14 ENCOUNTER — ANESTHESIA (OUTPATIENT)
Dept: CARDIOLOGY | Facility: HOSPITAL | Age: 55
End: 2025-02-14
Payer: MEDICARE

## 2025-02-14 ENCOUNTER — APPOINTMENT (OUTPATIENT)
Dept: PRIMARY CARE | Facility: HOSPITAL | Age: 55
End: 2025-02-14
Payer: COMMERCIAL

## 2025-02-14 PROBLEM — G47.33 OSA (OBSTRUCTIVE SLEEP APNEA): Status: ACTIVE | Noted: 2025-02-14

## 2025-02-14 PROBLEM — E11.9 DIABETES MELLITUS, TYPE 2 (MULTI): Status: ACTIVE | Noted: 2025-02-14

## 2025-02-14 PROBLEM — I10 HTN (HYPERTENSION): Status: ACTIVE | Noted: 2025-02-14

## 2025-02-14 PROBLEM — J45.909 ASTHMA: Status: ACTIVE | Noted: 2025-02-14

## 2025-02-14 PROBLEM — I27.20 PULMONARY HYPERTENSION (MULTI): Status: ACTIVE | Noted: 2025-02-14

## 2025-02-14 PROBLEM — E66.9 OBESITY: Status: ACTIVE | Noted: 2025-02-14

## 2025-02-14 PROBLEM — E78.5 HYPERLIPIDEMIA: Status: ACTIVE | Noted: 2025-02-14

## 2025-02-14 PROBLEM — I50.30 DIASTOLIC CONGESTIVE HEART FAILURE: Status: ACTIVE | Noted: 2025-02-14

## 2025-02-14 LAB
GLUCOSE BLD MANUAL STRIP-MCNC: 84 MG/DL (ref 74–99)
GLUCOSE BLD MANUAL STRIP-MCNC: 84 MG/DL (ref 74–99)
GLUCOSE BLD MANUAL STRIP-MCNC: 93 MG/DL (ref 74–99)
GLUCOSE BLD MANUAL STRIP-MCNC: 93 MG/DL (ref 74–99)
GLUCOSE BLD MANUAL STRIP-MCNC: 94 MG/DL (ref 74–99)
GLUCOSE BLD MANUAL STRIP-MCNC: 94 MG/DL (ref 74–99)
GLUCOSE BLD MANUAL STRIP-MCNC: 98 MG/DL (ref 74–99)
GLUCOSE BLD MANUAL STRIP-MCNC: 98 MG/DL (ref 74–99)
UFH PPP CHRO-ACNC: 0.2 IU/ML
UFH PPP CHRO-ACNC: 0.2 IU/ML
UFH PPP CHRO-ACNC: 0.4 IU/ML

## 2025-02-14 PROCEDURE — 2500000004 HC RX 250 GENERAL PHARMACY W/ HCPCS (ALT 636 FOR OP/ED)

## 2025-02-14 PROCEDURE — 2500000004 HC RX 250 GENERAL PHARMACY W/ HCPCS (ALT 636 FOR OP/ED): Mod: JW

## 2025-02-14 PROCEDURE — 3700000002 HC GENERAL ANESTHESIA TIME - EACH INCREMENTAL 1 MINUTE: Performed by: INTERNAL MEDICINE

## 2025-02-14 PROCEDURE — 92960 CARDIOVERSION ELECTRIC EXT: CPT | Performed by: INTERNAL MEDICINE

## 2025-02-14 PROCEDURE — 1200000002 HC GENERAL ROOM WITH TELEMETRY DAILY

## 2025-02-14 PROCEDURE — 2720000007 HC OR 272 NO HCPCS: Performed by: INTERNAL MEDICINE

## 2025-02-14 PROCEDURE — 36415 COLL VENOUS BLD VENIPUNCTURE: CPT

## 2025-02-14 PROCEDURE — 75572 CT HRT W/3D IMAGE: CPT

## 2025-02-14 PROCEDURE — 2500000001 HC RX 250 WO HCPCS SELF ADMINISTERED DRUGS (ALT 637 FOR MEDICARE OP)

## 2025-02-14 PROCEDURE — 97161 PT EVAL LOW COMPLEX 20 MIN: CPT | Mod: GP

## 2025-02-14 PROCEDURE — 85520 HEPARIN ASSAY: CPT

## 2025-02-14 PROCEDURE — 2550000001 HC RX 255 CONTRASTS

## 2025-02-14 PROCEDURE — 93005 ELECTROCARDIOGRAM TRACING: CPT

## 2025-02-14 PROCEDURE — 99232 SBSQ HOSP IP/OBS MODERATE 35: CPT | Performed by: INTERNAL MEDICINE

## 2025-02-14 PROCEDURE — 3700000001 HC GENERAL ANESTHESIA TIME - INITIAL BASE CHARGE: Performed by: INTERNAL MEDICINE

## 2025-02-14 PROCEDURE — 97165 OT EVAL LOW COMPLEX 30 MIN: CPT | Mod: GO

## 2025-02-14 PROCEDURE — 75572 CT HRT W/3D IMAGE: CPT | Performed by: STUDENT IN AN ORGANIZED HEALTH CARE EDUCATION/TRAINING PROGRAM

## 2025-02-14 PROCEDURE — 99233 SBSQ HOSP IP/OBS HIGH 50: CPT | Performed by: STUDENT IN AN ORGANIZED HEALTH CARE EDUCATION/TRAINING PROGRAM

## 2025-02-14 PROCEDURE — 82947 ASSAY GLUCOSE BLOOD QUANT: CPT

## 2025-02-14 PROCEDURE — RXMED WILLOW AMBULATORY MEDICATION CHARGE

## 2025-02-14 PROCEDURE — 93010 ELECTROCARDIOGRAM REPORT: CPT | Performed by: INTERNAL MEDICINE

## 2025-02-14 PROCEDURE — 5A2204Z RESTORATION OF CARDIAC RHYTHM, SINGLE: ICD-10-PCS | Performed by: INTERNAL MEDICINE

## 2025-02-14 PROCEDURE — 2500000004 HC RX 250 GENERAL PHARMACY W/ HCPCS (ALT 636 FOR OP/ED): Performed by: ANESTHESIOLOGIST ASSISTANT

## 2025-02-14 RX ORDER — LIDOCAINE 560 MG/1
1 PATCH PERCUTANEOUS; TOPICAL; TRANSDERMAL EVERY 24 HOURS
Qty: 10 PATCH | Refills: 0 | Status: SHIPPED | OUTPATIENT
Start: 2025-02-15

## 2025-02-14 RX ORDER — METOPROLOL SUCCINATE 100 MG/1
100 TABLET, EXTENDED RELEASE ORAL DAILY
Qty: 30 TABLET | Refills: 0 | Status: SHIPPED | OUTPATIENT
Start: 2025-02-14

## 2025-02-14 RX ORDER — PROPOFOL 10 MG/ML
INJECTION, EMULSION INTRAVENOUS AS NEEDED
Status: DISCONTINUED | OUTPATIENT
Start: 2025-02-14 | End: 2025-02-14

## 2025-02-14 RX ORDER — HEPARIN SODIUM 10000 [USP'U]/100ML
0-4000 INJECTION, SOLUTION INTRAVENOUS CONTINUOUS
Status: DISPENSED | OUTPATIENT
Start: 2025-02-14 | End: 2025-02-14

## 2025-02-14 RX ADMIN — APIXABAN 5 MG: 5 TABLET, FILM COATED ORAL at 21:00

## 2025-02-14 RX ADMIN — PROPOFOL 50 MG: 10 INJECTION, EMULSION INTRAVENOUS at 12:58

## 2025-02-14 RX ADMIN — GABAPENTIN 100 MG: 100 CAPSULE ORAL at 15:28

## 2025-02-14 RX ADMIN — GABAPENTIN 100 MG: 100 CAPSULE ORAL at 21:01

## 2025-02-14 RX ADMIN — GABAPENTIN 100 MG: 100 CAPSULE ORAL at 08:05

## 2025-02-14 RX ADMIN — SODIUM CHLORIDE: 9 INJECTION, SOLUTION INTRAVENOUS at 12:42

## 2025-02-14 RX ADMIN — ACETAMINOPHEN 975 MG: 325 TABLET ORAL at 08:05

## 2025-02-14 RX ADMIN — METOPROLOL TARTRATE 25 MG: 50 TABLET, FILM COATED ORAL at 21:01

## 2025-02-14 RX ADMIN — MONTELUKAST 10 MG: 10 TABLET, FILM COATED ORAL at 21:01

## 2025-02-14 RX ADMIN — METOPROLOL TARTRATE 25 MG: 50 TABLET, FILM COATED ORAL at 03:39

## 2025-02-14 RX ADMIN — IOHEXOL 70 ML: 350 INJECTION, SOLUTION INTRAVENOUS at 08:30

## 2025-02-14 RX ADMIN — POLYETHYLENE GLYCOL 3350 17 G: 17 POWDER, FOR SOLUTION ORAL at 21:01

## 2025-02-14 RX ADMIN — HEPARIN SODIUM AND DEXTROSE 1200 UNITS/HR: 10000; 5 INJECTION INTRAVENOUS at 15:20

## 2025-02-14 RX ADMIN — PANTOPRAZOLE SODIUM 40 MG: 40 TABLET, DELAYED RELEASE ORAL at 08:05

## 2025-02-14 RX ADMIN — METOPROLOL TARTRATE 25 MG: 50 TABLET, FILM COATED ORAL at 15:28

## 2025-02-14 RX ADMIN — HYDROMORPHONE HYDROCHLORIDE 0.4 MG: 0.5 INJECTION, SOLUTION INTRAMUSCULAR; INTRAVENOUS; SUBCUTANEOUS at 21:06

## 2025-02-14 RX ADMIN — METOPROLOL TARTRATE 25 MG: 50 TABLET, FILM COATED ORAL at 08:05

## 2025-02-14 RX ADMIN — CHOLECALCIFEROL TAB 125 MCG (5000 UNIT) 250 MCG: 125 TAB at 08:05

## 2025-02-14 RX ADMIN — ATORVASTATIN CALCIUM 20 MG: 20 TABLET, FILM COATED ORAL at 21:01

## 2025-02-14 ASSESSMENT — COGNITIVE AND FUNCTIONAL STATUS - GENERAL
MOBILITY SCORE: 24
DAILY ACTIVITIY SCORE: 24
DAILY ACTIVITIY SCORE: 24
MOVING TO AND FROM BED TO CHAIR: A LITTLE
CLIMB 3 TO 5 STEPS WITH RAILING: A LITTLE
STANDING UP FROM CHAIR USING ARMS: A LITTLE
DAILY ACTIVITIY SCORE: 24
MOBILITY SCORE: 19
WALKING IN HOSPITAL ROOM: A LITTLE
MOBILITY SCORE: 24
TURNING FROM BACK TO SIDE WHILE IN FLAT BAD: A LITTLE

## 2025-02-14 ASSESSMENT — PAIN - FUNCTIONAL ASSESSMENT
PAIN_FUNCTIONAL_ASSESSMENT: 0-10

## 2025-02-14 ASSESSMENT — PAIN SCALES - GENERAL
PAINLEVEL_OUTOF10: 7
PAINLEVEL_OUTOF10: 0 - NO PAIN

## 2025-02-14 ASSESSMENT — ACTIVITIES OF DAILY LIVING (ADL)
ADL_ASSISTANCE: INDEPENDENT
BATHING_ASSISTANCE: MODIFIED INDEPENDENT (DEVICE)
ADL_ASSISTANCE: INDEPENDENT

## 2025-02-14 ASSESSMENT — PAIN DESCRIPTION - LOCATION: LOCATION: ABDOMEN

## 2025-02-14 NOTE — CARE PLAN
Problem: Discharge Planning  Goal: Discharge to home or other facility with appropriate resources  Outcome: Progressing     Problem: Chronic Conditions and Co-morbidities  Goal: Patient's chronic conditions and co-morbidity symptoms are monitored and maintained or improved  Outcome: Progressing     Patient safe and stable. Cardioversion successful, x1 shock into NSR. Transition from heparin gtt to Eliquis tonight, discharge tomorrow morning.
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
## 2025-02-14 NOTE — ANESTHESIA PREPROCEDURE EVALUATION
Patient: Smitha Frank    Procedure Information       Date/Time: 02/14/25 1215    Procedure: Cardioversion - CT Watchman 2/14/25 was negative for LA/SONIA thrombus    Location: [facility] [id number] EP PRE/POST BAY 1 / Virtual [facility] MAT 3529 Cardiac Cath Lab    Providers: David Cabral MD            Relevant Problems   Anesthesia (within normal limits)      Cardiac   (+) Atrial flutter with rapid ventricular response (Multi)   (+) Diastolic congestive heart failure   (+) HTN (hypertension)   (+) Hyperlipidemia      Pulmonary   (+) Asthma (Inhaler use daily, last attack January 2025)   (+) MIROSLAVA (obstructive sleep apnea) (No CPAP)   (+) Pulmonary hypertension (Multi)      Neuro (within normal limits)      GI (within normal limits)      /Renal (within normal limits)      Liver (within normal limits)      Endocrine   (+) Diabetes mellitus, type 2 (Multi)   (+) Obesity      Hematology (within normal limits)      Musculoskeletal (within normal limits)       2/13/25 TTE CONCLUSIONS:   1. Poorly visualized anatomical structures due to suboptimal image quality.   2. Left ventricular ejection fraction is normal, calculated by Anderson's biplane at 57%.   3. Spectral Doppler shows a normal pattern of left ventricular diastolic filling.   4. There is normal right ventricular global systolic function.   5. Mild to moderate tricuspid regurgitation visualized.   6. Right ventricular systolic pressure is within normal limits.    Clinical information reviewed:   Tobacco  Allergies  Meds   Med Hx  Surg Hx   Fam Hx  Soc Hx        NPO Detail:  NPO/Void Status  Date of Last Liquid: 02/13/25  Time of Last Liquid: 2200         Physical Exam    Airway  Mallampati: I  TM distance: >3 FB  Neck ROM: full     Cardiovascular    Dental    Pulmonary    Abdominal            Anesthesia Plan    History of general anesthesia?: yes  History of complications of general anesthesia?: no    ASA 3     MAC   (MAC and GA R/B discussed.  Questions welcomed.  Pt agrees  with plan.)  Anesthetic plan and risks discussed with patient.  Use of blood products discussed with patient who consented to blood products.    Plan discussed with CAA and attending.

## 2025-02-14 NOTE — PROGRESS NOTES
Pharmacy Medication History Review    Smitha Frank is a 55 y.o. female admitted for Atrial flutter with rapid ventricular response (Multi). Pharmacy reviewed the patient's [id number] medications and allergies for accuracy.    The list below reflects the updated PTA list.   Prior to Admission Medications   Prescriptions Last Dose Informant Patient Reported? Taking?   Breo Ellipta 100-25 mcg/dose inhaler 2/13/2025 Morning Self Yes Yes   Sig: Inhale 1 puff once daily.   Mounjaro 15 mg/0.5 mL pen injector 2/11/2025 Self Yes Yes   Sig: Inject 15 mg under the skin 1 (one) time per week.   albuterol 2.5 mg /3 mL (0.083 %) nebulizer solution Past Month Self Yes Yes   Sig: Take 3 mL (2.5 mg) by nebulization every 4 hours if needed for wheezing.   albuterol 90 mcg/actuation inhaler Past Month Self Yes Yes   Sig: Inhale 2 puffs every 4 hours if needed for wheezing or shortness of breath.   atorvastatin (Lipitor) 20 mg tablet 2/13/2025 Evening Self Yes Yes   Sig: Take 1 tablet (20 mg) by mouth early in the morning..   cholecalciferol (Vitamin D-3) 125 mcg (5000 UT) capsule 2/12/2025 Self Yes Yes   Sig: Take 1 capsule (125 mcg) by mouth once daily.   latanoprost (Xalatan) 0.005 % ophthalmic solution Past Week  Yes Yes   Sig: Administer 1 drop into both eyes once daily at bedtime.   losartan (Cozaar) 100 mg tablet 2/12/2025 Self Yes Yes   Sig: Take 1 tablet (100 mg) by mouth early in the morning..   metFORMIN (Glucophage) 1,000 mg tablet 2/12/2025 Morning Self Yes Yes   Sig: Take 1 tablet (1,000 mg) by mouth 2 times daily (morning and late afternoon).   montelukast (Singulair) 10 mg tablet 2/13/2025 Evening Self Yes Yes   Sig: Take 1 tablet (10 mg) by mouth once daily.   multivitamin with minerals tablet 2/12/2025 Morning Self Yes Yes   Sig: Take 1 tablet by mouth once daily.   omega 3-dha-epa-fish oil (Fish OiL) 1,000 (120-180) mg capsule 2/12/2025 Morning Self Yes Yes   Sig: Take 1 capsule (1,000 mg) by mouth once daily.  "  potassium chloride CR 20 mEq ER tablet 2/12/2025 Self Yes Yes   Sig: Take 1 tablet (20 mEq) by mouth once daily.      Facility-Administered Medications: None        The list below reflects the updated allergy list. Please review each documented allergy for additional clarification and justification.  Allergies  Reviewed by Fanny Matt RN on 2/12/2025        Severity Reactions Comments    Penicillin Not Specified Unknown             Patient accepts M2B at discharge.     Sources:   Patient Interview - good historian  Admission MedRec Grid - none  OARRS - none recent  EPIC medication dispense report    Medications ADDED:  All medications above added to chart  Medications CHANGED:  None  Medications REMOVED/MARKED NOT TAKING:   None     Additional Comments:  Patient states can use Travatan or latanoprost depending on what insurance covers  Injects Mounjaro on Tuesdays    Tashia Matta, PharmD  Transitions of Care Pharmacist  02/13/25     Secure Chat preferred   If no response call [id number] or [facility] \"Med Rec\"    "
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
## 2025-02-14 NOTE — ANESTHESIA POSTPROCEDURE EVALUATION
Patient: Smitha Frank    Procedure Summary       Date: 02/14/25 Room / Location: [facility] [id number] EP PRE/POST BAY 1 / [id number] Cardiac Cath Lab    Anesthesia Start: 1243 Anesthesia Stop: 1304    Procedure: Cardioversion Diagnosis:       Atrial flutter with rapid ventricular response (Multi)      (AFL)    Providers: David Cabral MD Responsible Provider: Valdemar Llamas MD    Anesthesia Type: MAC ASA Status: 3            Anesthesia Type: MAC    Vitals Value Taken Time   /98 02/14/25 1304   Temp 36.2 02/14/25 1304   Pulse 96 02/14/25 1304   Resp 17 02/14/25 1304   SpO2 96 02/14/25 1304       Anesthesia Post Evaluation    Patient location during evaluation: PACU  Patient participation: complete - patient participated  Level of consciousness: awake  Pain scale: see RN record.  Pain management: adequate  Airway patency: patent  Cardiovascular status: acceptable  Respiratory status: acceptable  Hydration status: acceptable  Postoperative Nausea and Vomiting: none        No notable events documented.

## 2025-02-15 ENCOUNTER — PHARMACY VISIT (OUTPATIENT)
Dept: PHARMACY | Facility: CLINIC | Age: 55
End: 2025-02-15
Payer: MEDICARE

## 2025-02-15 VITALS
TEMPERATURE: 98.2 F | HEIGHT: 65 IN | DIASTOLIC BLOOD PRESSURE: 89 MMHG | BODY MASS INDEX: 45.66 KG/M2 | BODY MASS INDEX: 45.66 KG/M2 | WEIGHT: 274.03 LBS | SYSTOLIC BLOOD PRESSURE: 129 MMHG | HEART RATE: 95 BPM | SYSTOLIC BLOOD PRESSURE: 129 MMHG | RESPIRATION RATE: 20 BRPM | DIASTOLIC BLOOD PRESSURE: 89 MMHG | HEART RATE: 95 BPM | TEMPERATURE: 98.2 F | HEIGHT: 65 IN | RESPIRATION RATE: 20 BRPM | WEIGHT: 274.03 LBS | OXYGEN SATURATION: 98 % | OXYGEN SATURATION: 98 %

## 2025-02-15 LAB
ERYTHROCYTE [DISTWIDTH] IN BLOOD BY AUTOMATED COUNT: 14 % (ref 11.5–14.5)
ERYTHROCYTE [DISTWIDTH] IN BLOOD BY AUTOMATED COUNT: 14 % (ref 11.5–14.5)
GLUCOSE BLD MANUAL STRIP-MCNC: 95 MG/DL (ref 74–99)
GLUCOSE BLD MANUAL STRIP-MCNC: 95 MG/DL (ref 74–99)
HCT VFR BLD AUTO: 34.3 % (ref 36–46)
HCT VFR BLD AUTO: 34.3 % (ref 36–46)
HGB BLD-MCNC: 11.4 G/DL (ref 12–16)
HGB BLD-MCNC: 11.4 G/DL (ref 12–16)
MCH RBC QN AUTO: 27.6 PG (ref 26–34)
MCH RBC QN AUTO: 27.6 PG (ref 26–34)
MCHC RBC AUTO-ENTMCNC: 33.2 G/DL (ref 32–36)
MCHC RBC AUTO-ENTMCNC: 33.2 G/DL (ref 32–36)
MCV RBC AUTO: 83 FL (ref 80–100)
MCV RBC AUTO: 83 FL (ref 80–100)
NRBC BLD-RTO: 0 /100 WBCS (ref 0–0)
NRBC BLD-RTO: 0 /100 WBCS (ref 0–0)
PLATELET # BLD AUTO: 239 X10*3/UL (ref 150–450)
PLATELET # BLD AUTO: 239 X10*3/UL (ref 150–450)
RBC # BLD AUTO: 4.13 X10*6/UL (ref 4–5.2)
RBC # BLD AUTO: 4.13 X10*6/UL (ref 4–5.2)
WBC # BLD AUTO: 6.6 X10*3/UL (ref 4.4–11.3)
WBC # BLD AUTO: 6.6 X10*3/UL (ref 4.4–11.3)

## 2025-02-15 PROCEDURE — 2500000001 HC RX 250 WO HCPCS SELF ADMINISTERED DRUGS (ALT 637 FOR MEDICARE OP)

## 2025-02-15 PROCEDURE — 2500000004 HC RX 250 GENERAL PHARMACY W/ HCPCS (ALT 636 FOR OP/ED)

## 2025-02-15 PROCEDURE — 85027 COMPLETE CBC AUTOMATED: CPT

## 2025-02-15 PROCEDURE — 99238 HOSP IP/OBS DSCHRG MGMT 30/<: CPT | Performed by: INTERNAL MEDICINE

## 2025-02-15 PROCEDURE — 2500000005 HC RX 250 GENERAL PHARMACY W/O HCPCS

## 2025-02-15 PROCEDURE — 36415 COLL VENOUS BLD VENIPUNCTURE: CPT

## 2025-02-15 PROCEDURE — 82947 ASSAY GLUCOSE BLOOD QUANT: CPT

## 2025-02-15 RX ADMIN — METOPROLOL TARTRATE 25 MG: 50 TABLET, FILM COATED ORAL at 08:54

## 2025-02-15 RX ADMIN — Medication 1 APPLICATION: at 10:30

## 2025-02-15 RX ADMIN — GABAPENTIN 100 MG: 100 CAPSULE ORAL at 08:54

## 2025-02-15 RX ADMIN — METOPROLOL TARTRATE 25 MG: 50 TABLET, FILM COATED ORAL at 03:32

## 2025-02-15 RX ADMIN — APIXABAN 5 MG: 5 TABLET, FILM COATED ORAL at 08:54

## 2025-02-15 RX ADMIN — POLYETHYLENE GLYCOL 3350 17 G: 17 POWDER, FOR SOLUTION ORAL at 08:55

## 2025-02-15 RX ADMIN — CHOLECALCIFEROL TAB 125 MCG (5000 UNIT) 250 MCG: 125 TAB at 08:53

## 2025-02-15 RX ADMIN — PANTOPRAZOLE SODIUM 40 MG: 40 TABLET, DELAYED RELEASE ORAL at 08:54

## 2025-02-15 RX ADMIN — LIDOCAINE 1 PATCH: 4 PATCH TOPICAL at 03:32

## 2025-02-15 ASSESSMENT — COGNITIVE AND FUNCTIONAL STATUS - GENERAL
MOBILITY SCORE: 24
DAILY ACTIVITIY SCORE: 24

## 2025-02-15 ASSESSMENT — PAIN - FUNCTIONAL ASSESSMENT: PAIN_FUNCTIONAL_ASSESSMENT: 0-10

## 2025-02-15 ASSESSMENT — PAIN SCALES - GENERAL: PAINLEVEL_OUTOF10: 0 - NO PAIN
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
## 2025-02-15 NOTE — CARE PLAN
Patient safe and stable, remains in NSR. Patient discharged, expressed understanding of AVS and discharge instructions. No questions or concerns. Ivs removed with tip intact and patient taken off telemetry monitoring. Left for home with meds to beds via wheelchair.       Problem: Pain - Adult  Goal: Verbalizes/displays adequate comfort level or baseline comfort level  Outcome: Met     Problem: Safety - Adult  Goal: Free from fall injury  Outcome: Met     Problem: Discharge Planning  Goal: Discharge to home or other facility with appropriate resources  Outcome: Met     Problem: Chronic Conditions and Co-morbidities  Goal: Patient's chronic conditions and co-morbidity symptoms are monitored and maintained or improved  Outcome: Met     Problem: Nutrition  Goal: Nutrient intake appropriate for maintaining nutritional needs  Outcome: Met

## 2025-02-15 NOTE — CARE PLAN
The clinical goals for the shift include Patient will remain HDS    Problem: Pain - Adult  Goal: Verbalizes/displays adequate comfort level or baseline comfort level  Outcome: Progressing     Problem: Safety - Adult  Goal: Free from fall injury  Outcome: Progressing     Problem: Discharge Planning  Goal: Discharge to home or other facility with appropriate resources  Outcome: Progressing     Problem: Chronic Conditions and Co-morbidities  Goal: Patient's chronic conditions and co-morbidity symptoms are monitored and maintained or improved  Outcome: Progressing     Problem: Nutrition  Goal: Nutrient intake appropriate for maintaining nutritional needs  Outcome: Progressing
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
## 2025-02-18 ENCOUNTER — TELEMEDICINE (OUTPATIENT)
Dept: SLEEP MEDICINE | Facility: HOSPITAL | Age: 55
End: 2025-02-18
Payer: COMMERCIAL

## 2025-02-18 ENCOUNTER — PATIENT OUTREACH (OUTPATIENT)
Dept: CARE COORDINATION | Facility: CLINIC | Age: 55
End: 2025-02-18
Payer: COMMERCIAL

## 2025-02-18 DIAGNOSIS — G47.30 SLEEP APNEA, UNSPECIFIED TYPE: Primary | ICD-10-CM

## 2025-02-18 DIAGNOSIS — I48.92 ATRIAL FLUTTER WITH RAPID VENTRICULAR RESPONSE (MULTI): ICD-10-CM

## 2025-02-18 PROCEDURE — 99204 OFFICE O/P NEW MOD 45 MIN: CPT | Performed by: GENERAL PRACTICE

## 2025-02-18 PROCEDURE — 4010F ACE/ARB THERAPY RXD/TAKEN: CPT | Performed by: GENERAL PRACTICE

## 2025-02-18 PROCEDURE — 1036F TOBACCO NON-USER: CPT | Performed by: GENERAL PRACTICE

## 2025-02-18 PROCEDURE — 3044F HG A1C LEVEL LT 7.0%: CPT | Performed by: GENERAL PRACTICE

## 2025-02-18 SDOH — ECONOMIC STABILITY: FOOD INSECURITY
ARE ANY OF YOUR NEEDS URGENT? FOR EXAMPLE, UNCERTAINTY OF WHERE YOU WILL GET YOUR NEXT MEAL OR NOT HAVING THE MEDICATIONS YOU NEED TO TAKE TOMORROW.: NO

## 2025-02-18 SDOH — ECONOMIC STABILITY: GENERAL: WOULD YOU LIKE HELP WITH ANY OF THE FOLLOWING NEEDS?: I DONT NEED HELP WITH ANY OF THESE

## 2025-02-18 NOTE — SIGNIFICANT EVENT
02/18/25 1120   Social Determinants of Health- Help Requested   Would you like help with any of the following needs? I dont need help with any of these   Are any of your needs urgent? For example, uncertainty of where you will get your next meal or not having the medications you need to take tomorrow. N

## 2025-02-18 NOTE — PROGRESS NOTES
Patient: Smitha Frank    [id number]  : 1970 -- AGE 55 y.o.    Provider: Victor M Moyer DO     [facility]   Service Date: 2025              [facility]  New Visit Note      Virtual or Telephone Consent  An interactive audio and video telecommunication system which permits real time communications between the patient (at the originating site) and provider (at the distant site) was utilized to provide this telehealth service.     Verbal consent was requested and obtained from Smitha Frank on this date, 25 for a telehealth visit.     HISTORY OF PRESENT ILLNESS     The patient's referring provider is: Aashish Kramer MD    HISTORY OF PRESENT ILLNESS   Smitha Frank is a 55 y.o. female who presents to a [facility] for a sleep medicine evaluation with concerns of No chief complaint on file..     The patient  has a past medical history of Acute pharyngitis, unspecified (2014), Cough, unspecified (2014), Other abnormal and inconclusive findings on diagnostic imaging of breast (2015), Personal history of other benign neoplasm (2014), Personal history of other diseases of the respiratory system (2014), and Unspecified asthma, uncomplicated ([facility]) (2022)..    PAST SLEEP HISTORY    Pmhx includes asthma, A-fib/ flutter s/p cardio-conversion?, seasonal allergies, HTN, obesity on mounjaro, HfpEF.    Around , patient had a sleep study in preparation for bariatric surgery. She was not started on therapy at that time and did not get bariatric surgery done.   She was retested for sleep apnea around  due to a cough and was started on pap therapy. She has a recalled machine.     She no longer has a pap machine as it was taken.   DME unknown.     CURRENT HISTORY    On today's visit, 2025, the patient reports that she is establishing care for her sleep apnea.     Patient  is having connectivity issues thus this visit is limited by these technical difficulties.       REVIEW OF SYSTEMS     REVIEW OF SYSTEMS  Review of Systems   All other systems reviewed and are negative.        ALLERGIES AND MEDICATIONS     ALLERGIES  Allergies   Allergen Reactions    Macrolide Antibiotics Shortness of breath, Hives, Nausea And Vomiting and Unknown    Penicillins Other, Shortness of breath and Hives    Sulfa (Sulfonamide Antibiotics) Shortness of breath    Adhesive Tape-Silicones Unknown    Bee Venom Protein (Honey Bee) Unknown    Ciprofloxacin Unknown    Erythromycin Base Unknown    Penicillin Unknown       MEDICATIONS  Current Outpatient Medications   Medication Sig Dispense Refill    acetaminophen (Tylenol) 325 mg tablet Take 2 tablets (650 mg) by mouth every 4 hours if needed.      albuterol 2.5 mg /3 mL (0.083 %) nebulizer solution USE 1 UNIT DOSE EVERY 4-6 HOURS AS NEEDED FOR WHEEZING . 75 mL 3    albuterol 2.5 mg /3 mL (0.083 %) nebulizer solution Take 3 mL (2.5 mg) by nebulization every 4 hours if needed for wheezing.      albuterol 90 mcg/actuation inhaler INHALE 2 PUFFS BY MOUTH EVERY 4 TO 6 HOURS AS NEEDED 18 g 3    albuterol 90 mcg/actuation inhaler Inhale 2 puffs every 4 hours if needed for wheezing or shortness of breath.      apixaban (Eliquis) 5 mg tablet Take 1 tablet (5 mg) by mouth every 12 hours. 60 tablet 0    ascorbic acid (Vitamin C) 500 mg chewable tablet Chew 1 tablet (500 mg) once daily.      atorvastatin (Lipitor) 20 mg tablet Take 1 tablet (20 mg) by mouth once daily. 90 tablet 3    atorvastatin (Lipitor) 20 mg tablet Take 1 tablet (20 mg) by mouth early in the morning..      blood sugar diagnostic (Accu-Chek Guide test strips) strip TEST twice a day      Breo Ellipta 100-25 mcg/dose inhaler Inhale 1 puff once daily.      budesonide (Pulmicort) 1 mg/2 mL nebulizer solution Take 2 mL (1 mg) by nebulization 2 times a day. 60 mL 3    cholecalciferol (Vitamin D-3) 1,250 mcg  (50,000 unit) capsule Take 1 capsule (50,000 Units) by mouth every 14 (fourteen) days.      cholecalciferol (Vitamin D-3) 125 mcg (5000 UT) capsule Take 1 capsule (125 mcg) by mouth once daily.      desonide (DesOwen) 0.05 % cream       diphenhydrAMINE (BENADryl) 50 mg capsule Take 1 capsule (50 mg) by mouth once daily as needed.      doxycycline (Monodox) 100 mg capsule One PO BID x 1 then One PO every day. Take with at least 8 ounces (large glass) of water, do not lie down for 30 minutes after 11 capsule 0    fluticasone (Flonase) 50 mcg/actuation nasal spray Administer 4 sprays into affected nostril(s) 3 times a day.      fluticasone furoate-vilanteroL (Breo Elipta) 100-25 mcg/dose inhaler Inhale 1 puff once daily.      furosemide (Lasix) 20 mg tablet Take 1 tablet (20 mg) by mouth 2 times a day. 60 tablet 0    gabapentin (Neurontin) 100 mg capsule Take 1-2 capsules (100-200 mg) by mouth 3 times a day as needed.      gentamicin (Garamycin) 0.1 % cream       ipratropium-albuteroL (Duo-Neb) 0.5-2.5 mg/3 mL nebulizer solution Inhale 3 mL every 6 hours.      latanoprost (Xalatan) 0.005 % ophthalmic solution Administer into affected eye(s).      latanoprost (Xalatan) 0.005 % ophthalmic solution Administer 1 drop into both eyes once daily at bedtime.      lidocaine 4 % patch Place 1 patch over 12 hours on the skin once every 24 hours. Remove & discard patch within 12 hours or as directed by MD. 10 patch 0    losartan (Cozaar) 100 mg tablet Take 1 tablet (100 mg) by mouth once daily. 30 tablet 0    losartan (Cozaar) 100 mg tablet Take 1 tablet (100 mg) by mouth early in the morning..      magnesium gluconate (Magonate) 27.5 mg magne- sium (500 mg) tablet Take 200 mg by mouth 2 times a day.      metFORMIN (Glucophage) 1,000 mg tablet Take 1 tablet (1,000 mg) by mouth 2 times daily (morning and late afternoon).      metoprolol succinate XL (Toprol-XL) 100 mg 24 hr tablet Take 1 tablet (100 mg) by mouth once daily. Do not  crush or chew. 30 tablet 0    montelukast (Singulair) 10 mg tablet Take 1 tablet (10 mg) by mouth once daily at bedtime. ----MUST MAKE APPT 30 tablet 0    montelukast (Singulair) 10 mg tablet Take 1 tablet (10 mg) by mouth once daily.      Mounjaro 15 mg/0.5 mL pen injector Inject 15 mg under the skin 1 (one) time per week.      multivitamin tablet Take 1 tablet by mouth once daily.      multivitamin with minerals tablet Take 1 tablet by mouth once daily.      naproxen (Naprosyn) 500 mg tablet Take 1 tablet (500 mg) by mouth every 12 hours.      omega 3-dha-epa-fish oil (Fish OiL) 1,000 (120-180) mg capsule Take 1 capsule (1,000 mg) by mouth once daily.      phentermine 37.5 mg capsule Take 1 capsule (37.5 mg) by mouth once daily in the morning. Take before meals. 30 capsule 2    potassium chloride CR 20 mEq ER tablet Take 1 tablet (20 mEq) by mouth once daily. ----MUST MAKE APPT FOR FURTHER REFILLS 30 tablet 0    potassium chloride CR 20 mEq ER tablet Take 1 tablet (20 mEq) by mouth once daily.      travoprost (Travatan Z) 0.004 % drops ophthalmic solution INSTILL 1 DROP IN BOTH EYES EVERY NIGHT       No current facility-administered medications for this visit.         PAST HISTORY     PAST MEDICAL HISTORY  She  has a past medical history of Acute pharyngitis, unspecified (08/18/2014), Cough, unspecified (08/18/2014), Other abnormal and inconclusive findings on diagnostic imaging of breast (08/13/2015), Personal history of other benign neoplasm (08/18/2014), Personal history of other diseases of the respiratory system (08/18/2014), and Unspecified asthma, uncomplicated ([facility]) (02/03/2022).      PAST SURGICAL HISTORY:  Past Surgical History:   Procedure Laterality Date    APPENDECTOMY  08/18/2014    Appendectomy    CARDIAC ELECTROPHYSIOLOGY PROCEDURE N/A 2/14/2025    Procedure: Cardioversion;  Surgeon: David Cabral MD;  Location: [place] [address] Cardiac Cath Lab;  Service: Cardiovascular;  Laterality: N/A;  CT  Watchman 25 was negative for LA/SONIA thrombus     SECTION, CLASSIC  2014     Section    LAPAROSCOPY DIAGNOSTIC / BIOPSY / ASPIRATION / LYSIS  2014    Exploratory Laparoscopy    OTHER SURGICAL HISTORY  2014    Uterine Myomectomy Abdominal Approach    OTHER SURGICAL HISTORY  2014    Supracervical Hysterectomy Laparoscopic    OTHER SURGICAL HISTORY  2014    Gynecologic Serv Endometrial Cryoablation W/ Ultrason Guid    TUBAL LIGATION  2014    Tubal Ligation       FAMILY HISTORY  Family History   Problem Relation Name Age of Onset    Breast cancer Mother      Lung cancer Mother      Sleep apnea Mother      Heart disease Father      Hypertension Father      Diabetes Father      Sleep apnea Father      Breast cancer Sister      Multiple sclerosis Other Family Hx      DOES/DOES NOT EC: does have a family history of sleep disorder.      SOCIAL HISTORY  She  reports that she has never smoked. She has never used smokeless tobacco. She reports that she does not use drugs. No history on file for alcohol use.         PHYSICAL EXAM     VITAL SIGNS: There were no vitals taken for this visit.     PREVIOUS WEIGHTS:  Wt Readings from Last 3 Encounters:   02/15/25 124 kg (274 lb 0.5 oz)   11/15/24 120 kg (265 lb 9.6 oz)   24 127 kg (279 lb 1 oz)       Physical Exam  CONSTITUTIONAL: Patient appears well developed and well nourished.   GENERAL: This is a healthy appearing patient . The patient is alert and appropriately verbally conversant   FACE: The face was inspected and no cutaneous masses or lesions were visualized.   EYES: Extra-ocular muscle function was intact.   ORAL CAVITY: Examination of the oral cavity revealed no mass lesions nor infection.   EARS: Examination of the ears revealed that the auricles were normally formed with no lesions.   NECK: No skin lesions or inflammatory processes were detected on visual inspection.  CARDIOVASCULAR: Peripheral perfusion  intact, no evidence of cyanosis, or clubbing.   RESPIRATORY: Normal inspiration and expiration and chest wall expansion, no use of accessory muscles to breathe, no stridor.  NEUROLOGICAL: Mentation is clear. Alert and oriented to time, place, and person. Answering questions appropriately.  PSYCHIATRIC: Normal affect and appropriate behavior. Mood is without obvious agitation or disturbance.         RESULTS/DATA                 ASSESSMENT/PLAN      is a 55 y.o. female and  has a past medical history of Acute pharyngitis, unspecified (08/18/2014), Cough, unspecified (08/18/2014), Other abnormal and inconclusive findings on diagnostic imaging of breast (08/13/2015), Personal history of other benign neoplasm (08/18/2014), Personal history of other diseases of the respiratory system (08/18/2014), and Unspecified asthma, uncomplicated ([facility]-HCC) (02/03/2022). She was referred to the [facility] for evaluation of sleep apnea.     Problem List Items Addressed This Visit       Atrial flutter with rapid ventricular response (Multi)     Other Visit Diagnoses       Sleep apnea, unspecified type    -  Primary    Relevant Orders    Home sleep apnea test (HSAT)            Problem List and Orders    Pmhx includes asthma, A-fib/ flutter s/p cardio-conversion?, seasonal allergies, HTN, obesity on mounjaro, HfpEF.    1- hx sleep apnea.   PSG 3/8/2012 -->mild MIROSLAVA, exacerbated to moderate in REM ; AHI 10.6.     Reviewed and discussed the above sleep study results and management options in details. All questions answered, patient verbalizes understanding.     -ordered hst    Patient was having connectivity issues thus this visit is limited by these technical difficulties.     -do not drive or operate heavy machinery if drowsy.  -avoid sedating substances/ medication, alcohol, illicit drugs and tobacco.    2- Obesity  counseled on eating a healthy diet and exercising as tolerated.  On mounjaro    3-  HTN  Cont. Current regimen  Follow up with cardiology    Follow up after sleep study or sooner as needed.
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
## 2025-02-20 ENCOUNTER — TELEPHONE (OUTPATIENT)
Dept: HEMATOLOGY/ONCOLOGY | Facility: HOSPITAL | Age: 55
End: 2025-02-20
Payer: COMMERCIAL

## 2025-02-20 DIAGNOSIS — E04.1 THYROID NODULE: Primary | ICD-10-CM

## 2025-02-20 DIAGNOSIS — D17.1 BREAST LIPOMA: ICD-10-CM

## 2025-02-20 NOTE — TELEPHONE ENCOUNTER
Referral placed to [facility] by Trauma services for thyroid nodule & breast mass c/w lipoma,, discovered incidentally on CT C/A/P imaging 2/12/25. I called the patient and discussed her imaging results and the referral. Advised follow-up w/ PCP - she does not currently have a primary care provider but is agreeable to an appt to establish. Referral placed; I advised her one of the [facility] navigators would reach out to her. In the interim, I will order the thyroid US and overdue screening mammogram.  verbalized understanding & agreement with the plan.  LENORE Lepe.CNP  [facility]

## 2025-02-21 ENCOUNTER — HOSPITAL ENCOUNTER (OUTPATIENT)
Dept: RADIOLOGY | Facility: CLINIC | Age: 55
Discharge: HOME | End: 2025-02-21
Payer: COMMERCIAL

## 2025-02-21 DIAGNOSIS — D17.1 BREAST LIPOMA: ICD-10-CM

## 2025-02-21 LAB
ATRIAL RATE: 94 BPM
P AXIS: 58 DEGREES
P OFFSET: 193 MS
P ONSET: 132 MS
PR INTERVAL: 166 MS
Q ONSET: 215 MS
QRS COUNT: 16 BEATS
QRS DURATION: 92 MS
QT INTERVAL: 382 MS
QTC CALCULATION(BAZETT): 477 MS
QTC FREDERICIA: 443 MS
R AXIS: 42 DEGREES
T AXIS: 38 DEGREES
T OFFSET: 406 MS
VENTRICULAR RATE: 94 BPM

## 2025-02-21 PROCEDURE — 76642 ULTRASOUND BREAST LIMITED: CPT | Mod: RT

## 2025-02-21 PROCEDURE — 76982 USE 1ST TARGET LESION: CPT | Mod: RT

## 2025-02-21 PROCEDURE — 77062 BREAST TOMOSYNTHESIS BI: CPT

## 2025-02-24 ENCOUNTER — HOSPITAL ENCOUNTER (OUTPATIENT)
Dept: RADIOLOGY | Facility: HOSPITAL | Age: 55
Discharge: HOME | End: 2025-02-24
Payer: COMMERCIAL

## 2025-02-24 ENCOUNTER — PROCEDURE VISIT (OUTPATIENT)
Dept: SLEEP MEDICINE | Facility: HOSPITAL | Age: 55
End: 2025-02-24
Payer: COMMERCIAL

## 2025-02-24 DIAGNOSIS — E04.1 THYROID NODULE: ICD-10-CM

## 2025-02-24 DIAGNOSIS — G47.30 SLEEP APNEA, UNSPECIFIED TYPE: ICD-10-CM

## 2025-02-24 PROCEDURE — 76536 US EXAM OF HEAD AND NECK: CPT | Performed by: STUDENT IN AN ORGANIZED HEALTH CARE EDUCATION/TRAINING PROGRAM

## 2025-02-24 PROCEDURE — 76536 US EXAM OF HEAD AND NECK: CPT

## 2025-02-24 PROCEDURE — 95806 SLEEP STUDY UNATT&RESP EFFT: CPT | Performed by: GENERAL PRACTICE

## 2025-02-25 ENCOUNTER — OFFICE VISIT (OUTPATIENT)
Dept: CARDIOLOGY | Facility: CLINIC | Age: 55
End: 2025-02-25
Payer: COMMERCIAL

## 2025-02-25 ENCOUNTER — APPOINTMENT (OUTPATIENT)
Dept: LAB | Facility: HOSPITAL | Age: 55
End: 2025-02-25
Payer: COMMERCIAL

## 2025-02-25 VITALS — OXYGEN SATURATION: 98 % | DIASTOLIC BLOOD PRESSURE: 78 MMHG | HEART RATE: 71 BPM | SYSTOLIC BLOOD PRESSURE: 119 MMHG

## 2025-02-25 DIAGNOSIS — Z01.818 PREOP TESTING: ICD-10-CM

## 2025-02-25 DIAGNOSIS — I48.92 ATRIAL FLUTTER WITH RAPID VENTRICULAR RESPONSE (MULTI): ICD-10-CM

## 2025-02-25 PROCEDURE — 4010F ACE/ARB THERAPY RXD/TAKEN: CPT | Performed by: INTERNAL MEDICINE

## 2025-02-25 PROCEDURE — 3044F HG A1C LEVEL LT 7.0%: CPT | Performed by: INTERNAL MEDICINE

## 2025-02-25 PROCEDURE — 99214 OFFICE O/P EST MOD 30 MIN: CPT | Mod: 25 | Performed by: INTERNAL MEDICINE

## 2025-02-25 PROCEDURE — 93010 ELECTROCARDIOGRAM REPORT: CPT | Performed by: INTERNAL MEDICINE

## 2025-02-25 PROCEDURE — 93005 ELECTROCARDIOGRAM TRACING: CPT | Performed by: INTERNAL MEDICINE

## 2025-02-25 PROCEDURE — 1036F TOBACCO NON-USER: CPT | Performed by: INTERNAL MEDICINE

## 2025-02-25 PROCEDURE — 3078F DIAST BP <80 MM HG: CPT | Performed by: INTERNAL MEDICINE

## 2025-02-25 PROCEDURE — 99214 OFFICE O/P EST MOD 30 MIN: CPT | Performed by: INTERNAL MEDICINE

## 2025-02-25 PROCEDURE — 3074F SYST BP LT 130 MM HG: CPT | Performed by: INTERNAL MEDICINE

## 2025-02-25 ASSESSMENT — PATIENT HEALTH QUESTIONNAIRE - PHQ9
1. LITTLE INTEREST OR PLEASURE IN DOING THINGS: NOT AT ALL
2. FEELING DOWN, DEPRESSED OR HOPELESS: NOT AT ALL
SUM OF ALL RESPONSES TO PHQ9 QUESTIONS 1 AND 2: 0

## 2025-02-25 ASSESSMENT — PAIN SCALES - GENERAL: PAINLEVEL_OUTOF10: 0-NO PAIN

## 2025-02-25 ASSESSMENT — ENCOUNTER SYMPTOMS
DEPRESSION: 0
OCCASIONAL FEELINGS OF UNSTEADINESS: 0
LOSS OF SENSATION IN FEET: 0
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
## 2025-02-26 ENCOUNTER — PATIENT OUTREACH (OUTPATIENT)
Dept: CARE COORDINATION | Facility: CLINIC | Age: 55
End: 2025-02-26
Payer: COMMERCIAL

## 2025-02-26 ENCOUNTER — TELEPHONE (OUTPATIENT)
Dept: SCHEDULING | Age: 55
End: 2025-02-26

## 2025-02-26 LAB
ANION GAP SERPL CALCULATED.4IONS-SCNC: 10 MMOL/L (CALC) (ref 7–17)
BUN SERPL-MCNC: 17 MG/DL (ref 7–25)
BUN/CREAT SERPL: NORMAL (CALC) (ref 6–22)
CALCIUM SERPL-MCNC: 9.3 MG/DL (ref 8.6–10.4)
CHLORIDE SERPL-SCNC: 106 MMOL/L (ref 98–110)
CO2 SERPL-SCNC: 27 MMOL/L (ref 20–32)
CREAT SERPL-MCNC: 0.64 MG/DL (ref 0.5–1.03)
EGFRCR SERPLBLD CKD-EPI 2021: 104 ML/MIN/1.73M2
ERYTHROCYTE [DISTWIDTH] IN BLOOD BY AUTOMATED COUNT: 13.7 % (ref 11–15)
GLUCOSE SERPL-MCNC: 86 MG/DL (ref 65–139)
HCT VFR BLD AUTO: 42.5 % (ref 35–45)
HGB BLD-MCNC: 13.8 G/DL (ref 11.7–15.5)
MCH RBC QN AUTO: 27.4 PG (ref 27–33)
MCHC RBC AUTO-ENTMCNC: 32.5 G/DL (ref 32–36)
MCV RBC AUTO: 84.5 FL (ref 80–100)
PLATELET # BLD AUTO: 293 THOUSAND/UL (ref 140–400)
PMV BLD REES-ECKER: 10 FL (ref 7.5–12.5)
POTASSIUM SERPL-SCNC: 4.4 MMOL/L (ref 3.5–5.3)
RBC # BLD AUTO: 5.03 MILLION/UL (ref 3.8–5.1)
SODIUM SERPL-SCNC: 143 MMOL/L (ref 135–146)
WBC # BLD AUTO: 6.9 THOUSAND/UL (ref 3.8–10.8)

## 2025-02-26 NOTE — PROGRESS NOTES
Outreach call to patient following up on appointment with primary care provider.  Patient states, she is doing good. Patient states, she is having more up's then down's. She is not as sore. Discussed Cardiology appointment and she is going to have to have the ablation. She states, the medications are doing their job just not fast enough. No medication changes at this time. Ablation to be scheduled. Patient with no additional questions at this time.  Will continue to follow.      Brenda Banda RN/CM  [facility]  [phone]

## 2025-02-27 LAB
ATRIAL RATE: 73 BPM
P AXIS: 74 DEGREES
P OFFSET: 211 MS
P ONSET: 148 MS
PR INTERVAL: 140 MS
Q ONSET: 218 MS
QRS COUNT: 12 BEATS
QRS DURATION: 88 MS
QT INTERVAL: 390 MS
QTC CALCULATION(BAZETT): 429 MS
QTC FREDERICIA: 416 MS
R AXIS: 85 DEGREES
T AXIS: 72 DEGREES
T OFFSET: 413 MS
VENTRICULAR RATE: 73 BPM

## 2025-03-05 DIAGNOSIS — Z01.818 PREOP TESTING: ICD-10-CM

## 2025-03-20 ENCOUNTER — PATIENT OUTREACH (OUTPATIENT)
Dept: CARE COORDINATION | Facility: CLINIC | Age: 55
End: 2025-03-20
Payer: COMMERCIAL

## 2025-03-20 NOTE — PROGRESS NOTES
Outreach call to patient to check in 30 days after hospital discharge to support smooth transition of care. Patient states, she is doing pretty good. She scheduled to have cardiac ablation 3/28/2025. Patient with no additional needs noted. No additional outreach needed at this time. CM will close case.    Brenda Banda RN/CM  [facility]  [phone]

## 2025-03-25 ENCOUNTER — LAB (OUTPATIENT)
Dept: LAB | Facility: HOSPITAL | Age: 55
End: 2025-03-25
Payer: COMMERCIAL

## 2025-03-25 DIAGNOSIS — Z3A.10 10 WEEKS GESTATION OF PREGNANCY (HHS-HCC): Primary | ICD-10-CM

## 2025-03-25 LAB
ABO GROUP (TYPE) IN BLOOD: NORMAL
ANTIBODY SCREEN: NORMAL
RH FACTOR (ANTIGEN D): NORMAL

## 2025-03-25 PROCEDURE — 86850 RBC ANTIBODY SCREEN: CPT

## 2025-03-25 PROCEDURE — 86901 BLOOD TYPING SEROLOGIC RH(D): CPT

## 2025-03-25 PROCEDURE — 86900 BLOOD TYPING SEROLOGIC ABO: CPT

## 2025-03-26 ENCOUNTER — PATIENT OUTREACH (OUTPATIENT)
Dept: CARE COORDINATION | Facility: CLINIC | Age: 55
End: 2025-03-26
Payer: COMMERCIAL
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
## 2025-03-28 ENCOUNTER — HOSPITAL ENCOUNTER (OUTPATIENT)
Facility: HOSPITAL | Age: 55
Setting detail: OUTPATIENT SURGERY
Discharge: HOME | End: 2025-03-28
Attending: INTERNAL MEDICINE | Admitting: INTERNAL MEDICINE
Payer: COMMERCIAL

## 2025-03-28 VITALS — BODY MASS INDEX: 42.75 KG/M2 | HEIGHT: 66 IN | WEIGHT: 266 LBS

## 2025-03-28 DIAGNOSIS — I48.92 UNSPECIFIED ATRIAL FLUTTER (MULTI): ICD-10-CM

## 2025-03-28 LAB
ABO GROUP (TYPE) IN BLOOD: NORMAL
ANTIBODY SCREEN: NORMAL
GLUCOSE BLD MANUAL STRIP-MCNC: 76 MG/DL (ref 74–99)
RH FACTOR (ANTIGEN D): NORMAL

## 2025-03-28 PROCEDURE — 82947 ASSAY GLUCOSE BLOOD QUANT: CPT

## 2025-03-28 PROCEDURE — 86901 BLOOD TYPING SEROLOGIC RH(D): CPT | Performed by: INTERNAL MEDICINE

## 2025-03-28 PROCEDURE — 7100000010 HC PHASE TWO TIME - EACH INCREMENTAL 1 MINUTE: Performed by: INTERNAL MEDICINE

## 2025-03-28 PROCEDURE — 2500000004 HC RX 250 GENERAL PHARMACY W/ HCPCS (ALT 636 FOR OP/ED): Performed by: INTERNAL MEDICINE

## 2025-03-28 PROCEDURE — 99152 MOD SED SAME PHYS/QHP 5/>YRS: CPT | Performed by: INTERNAL MEDICINE

## 2025-03-28 PROCEDURE — 93653 COMPRE EP EVAL TX SVT: CPT | Performed by: INTERNAL MEDICINE

## 2025-03-28 PROCEDURE — C1730 CATH, EP, 19 OR FEW ELECT: HCPCS | Performed by: INTERNAL MEDICINE

## 2025-03-28 PROCEDURE — 99153 MOD SED SAME PHYS/QHP EA: CPT | Performed by: INTERNAL MEDICINE

## 2025-03-28 PROCEDURE — 2720000007 HC OR 272 NO HCPCS: Performed by: INTERNAL MEDICINE

## 2025-03-28 PROCEDURE — C1732 CATH, EP, DIAG/ABL, 3D/VECT: HCPCS | Performed by: INTERNAL MEDICINE

## 2025-03-28 PROCEDURE — 7100000002 HC RECOVERY ROOM TIME - EACH INCREMENTAL 1 MINUTE: Performed by: INTERNAL MEDICINE

## 2025-03-28 PROCEDURE — 76937 US GUIDE VASCULAR ACCESS: CPT | Performed by: INTERNAL MEDICINE

## 2025-03-28 PROCEDURE — 7100000009 HC PHASE TWO TIME - INITIAL BASE CHARGE: Performed by: INTERNAL MEDICINE

## 2025-03-28 PROCEDURE — 7100000001 HC RECOVERY ROOM TIME - INITIAL BASE CHARGE: Performed by: INTERNAL MEDICINE

## 2025-03-28 PROCEDURE — C1887 CATHETER, GUIDING: HCPCS | Performed by: INTERNAL MEDICINE

## 2025-03-28 RX ORDER — BUPIVACAINE HYDROCHLORIDE 5 MG/ML
INJECTION, SOLUTION EPIDURAL; INTRACAUDAL; PERINEURAL AS NEEDED
Status: DISCONTINUED | OUTPATIENT
Start: 2025-03-28 | End: 2025-03-28 | Stop reason: HOSPADM

## 2025-03-28 RX ORDER — MIDAZOLAM HYDROCHLORIDE 1 MG/ML
INJECTION, SOLUTION INTRAMUSCULAR; INTRAVENOUS AS NEEDED
Status: DISCONTINUED | OUTPATIENT
Start: 2025-03-28 | End: 2025-03-28 | Stop reason: HOSPADM

## 2025-03-28 RX ORDER — FENTANYL CITRATE 50 UG/ML
INJECTION, SOLUTION INTRAMUSCULAR; INTRAVENOUS AS NEEDED
Status: DISCONTINUED | OUTPATIENT
Start: 2025-03-28 | End: 2025-03-28 | Stop reason: HOSPADM

## 2025-03-28 ASSESSMENT — PAIN SCALES - GENERAL
PAINLEVEL_OUTOF10: 0 - NO PAIN

## 2025-03-28 ASSESSMENT — PAIN - FUNCTIONAL ASSESSMENT
PAIN_FUNCTIONAL_ASSESSMENT: 0-10

## 2025-03-28 ASSESSMENT — COLUMBIA-SUICIDE SEVERITY RATING SCALE - C-SSRS
2. HAVE YOU ACTUALLY HAD ANY THOUGHTS OF KILLING YOURSELF?: NO
1. IN THE PAST MONTH, HAVE YOU WISHED YOU WERE DEAD OR WISHED YOU COULD GO TO SLEEP AND NOT WAKE UP?: NO
6. HAVE YOU EVER DONE ANYTHING, STARTED TO DO ANYTHING, OR PREPARED TO DO ANYTHING TO END YOUR LIFE?: NO
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
## 2025-03-28 NOTE — DISCHARGE INSTRUCTIONS
INSTRUCTIONS AFTER ABLATION PROCEDURE:    * You will need to continue blood thinner (Eliquis every 12 hours) until instructed otherwise. It is important not to interrupt blood thinner for any reason (other than an emergency) during the first 30 days after ablation.    * In the first week after ablation you should take it easy. No heavy lifting or heavy exertion, no treadmill.  You can use the stairs if needed but go slowly and minimize the number of times up and down.    * Some minor bruising is common at each groin access site with minor soreness as if you had banged the area. Bruising may occasionally be seen to extend down the leg. This is normal as is an occasional small quarter sized bump in the area. If larger swelling or more significant pain occurs at the area, please contact the office or go the nearest Emergency Room.    * All medications will generally remain the same unless you are told otherwise.  Resume taking your home medications today as listed on the discharge instructions.    *Continue to follow up with your primary cardiologist, primary care physician, and any other specialists you normally see.    *No driving for 2 days post procedure (IF you were driving prior to procedure).    *Diet: Heart healthy    Call Provider If:  Breathing faster than normal.     Fever of 100.4 F (38 C) or higher.     Chills.     Any new concerning symptoms.     Passing out.     Patient Instructions, Next 24 hours:  DO NOT drive a car, operate machinery or power tools.  It is recommended that a responsible adult be with you for the first 24 hours.     DO NOT drink any alcoholic drinks or take any non-prescriptive medications that contain alcohol for the first 24 hours.     DO NOT make any important decisions for the first 24 hours.    Activity:  You are advised to go directly home from the hospital.     DO NOT lift anything heavier than 10 pounds for one week, this allows for proper healing of the groin.     No excessive  exercise or treadmill use for one week. You may walk and do stairs, slowly.     No sexual activities for 24 hours after you arrive home.    Wound Care:  If slight bleeding should occur at site, lie down and have someone apply firm pressure just above the puncture site for 5 minutes.  If it continues or is profuse, call 911. Always notify your doctor if bleeding occurs.     Keep site clean and dry. Let air dry or you may use a simple bandaid.     Gently cleanse the puncture site in your groin with soap and water only.     You may experience some tenderness, bruising or minimal inflammation.  If you have any concerns, you may contact the Cath Lab or if any of these symptoms become excessive, contact your cardiologist or go to the emergency room.     No tub baths, soaking, or swimming for one week.     May shower the next day after your procedure.    If you have any questions about the effects of the sedative drugs or groin care, please call the physician who performed your procedure.    FOLLOW UP:   1) Dr Oneil Mack ( Electrophysiology) in 6 weeks after ablation as scheduled

## 2025-04-07 ENCOUNTER — OFFICE VISIT (OUTPATIENT)
Facility: HOSPITAL | Age: 55
End: 2025-04-07
Payer: COMMERCIAL

## 2025-04-07 VITALS
HEIGHT: 66 IN | TEMPERATURE: 97.2 F | DIASTOLIC BLOOD PRESSURE: 85 MMHG | BODY MASS INDEX: 42.94 KG/M2 | HEART RATE: 96 BPM | SYSTOLIC BLOOD PRESSURE: 135 MMHG | OXYGEN SATURATION: 99 % | WEIGHT: 267.2 LBS

## 2025-04-07 DIAGNOSIS — M54.32 SCIATICA OF LEFT SIDE: Primary | ICD-10-CM

## 2025-04-07 DIAGNOSIS — D17.1 BREAST LIPOMA: ICD-10-CM

## 2025-04-07 DIAGNOSIS — E04.1 THYROID NODULE: ICD-10-CM

## 2025-04-07 PROCEDURE — 3079F DIAST BP 80-89 MM HG: CPT | Performed by: FAMILY MEDICINE

## 2025-04-07 PROCEDURE — 99214 OFFICE O/P EST MOD 30 MIN: CPT | Performed by: FAMILY MEDICINE

## 2025-04-07 PROCEDURE — 1036F TOBACCO NON-USER: CPT | Performed by: FAMILY MEDICINE

## 2025-04-07 PROCEDURE — 3044F HG A1C LEVEL LT 7.0%: CPT | Performed by: FAMILY MEDICINE

## 2025-04-07 PROCEDURE — 4010F ACE/ARB THERAPY RXD/TAKEN: CPT | Performed by: FAMILY MEDICINE

## 2025-04-07 PROCEDURE — 3008F BODY MASS INDEX DOCD: CPT | Performed by: FAMILY MEDICINE

## 2025-04-07 PROCEDURE — 3075F SYST BP GE 130 - 139MM HG: CPT | Performed by: FAMILY MEDICINE

## 2025-04-07 RX ORDER — GABAPENTIN 100 MG/1
100-200 CAPSULE ORAL 3 TIMES DAILY
Qty: 180 CAPSULE | Refills: 2 | Status: SHIPPED | OUTPATIENT
Start: 2025-04-07 | End: 2025-07-06

## 2025-04-07 RX ORDER — NAPROXEN 500 MG/1
500 TABLET ORAL EVERY 12 HOURS
Qty: 60 TABLET | Refills: 2 | Status: SHIPPED | OUTPATIENT
Start: 2025-04-07 | End: 2025-07-06

## 2025-04-07 ASSESSMENT — PAIN SCALES - GENERAL: PAINLEVEL_OUTOF10: 9

## 2025-04-07 ASSESSMENT — ENCOUNTER SYMPTOMS: CONSTITUTIONAL NEGATIVE: 1
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
## 2025-04-14 ENCOUNTER — TELEPHONE (OUTPATIENT)
Dept: PRIMARY CARE | Facility: CLINIC | Age: 55
End: 2025-04-14

## 2025-04-14 NOTE — TELEPHONE ENCOUNTER
Patient would like to speak with you about a referral for orthopedics. Also patient has questions about getting some pain medication.

## 2025-04-24 ENCOUNTER — OFFICE VISIT (OUTPATIENT)
Dept: ORTHOPEDIC SURGERY | Facility: CLINIC | Age: 55
End: 2025-04-24
Payer: COMMERCIAL

## 2025-04-24 ENCOUNTER — HOSPITAL ENCOUNTER (OUTPATIENT)
Dept: RADIOLOGY | Facility: CLINIC | Age: 55
Discharge: HOME | End: 2025-04-24
Payer: COMMERCIAL

## 2025-04-24 DIAGNOSIS — M25.551 RIGHT HIP PAIN: ICD-10-CM

## 2025-04-24 DIAGNOSIS — M54.16 LUMBAR RADICULOPATHY: ICD-10-CM

## 2025-04-24 PROCEDURE — 4010F ACE/ARB THERAPY RXD/TAKEN: CPT | Performed by: ORTHOPAEDIC SURGERY

## 2025-04-24 PROCEDURE — 99204 OFFICE O/P NEW MOD 45 MIN: CPT | Performed by: ORTHOPAEDIC SURGERY

## 2025-04-24 PROCEDURE — 3044F HG A1C LEVEL LT 7.0%: CPT | Performed by: ORTHOPAEDIC SURGERY

## 2025-04-24 PROCEDURE — 73502 X-RAY EXAM HIP UNI 2-3 VIEWS: CPT | Mod: RT
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
## 2025-04-25 RX ORDER — MELOXICAM 15 MG/1
15 TABLET ORAL DAILY
Qty: 30 TABLET | Refills: 11 | Status: SHIPPED | OUTPATIENT
Start: 2025-04-25 | End: 2026-04-25

## 2025-04-29 ENCOUNTER — OFFICE VISIT (OUTPATIENT)
Dept: URGENT CARE | Age: 55
End: 2025-04-29
Payer: COMMERCIAL

## 2025-04-29 VITALS
DIASTOLIC BLOOD PRESSURE: 86 MMHG | RESPIRATION RATE: 18 BRPM | HEART RATE: 76 BPM | OXYGEN SATURATION: 96 % | TEMPERATURE: 98.4 F | SYSTOLIC BLOOD PRESSURE: 134 MMHG

## 2025-04-29 DIAGNOSIS — G89.29 CHRONIC RIGHT-SIDED LOW BACK PAIN WITH RIGHT-SIDED SCIATICA: Primary | ICD-10-CM

## 2025-04-29 DIAGNOSIS — M54.41 CHRONIC RIGHT-SIDED LOW BACK PAIN WITH RIGHT-SIDED SCIATICA: Primary | ICD-10-CM

## 2025-04-29 PROCEDURE — 3044F HG A1C LEVEL LT 7.0%: CPT | Performed by: SPECIALIST

## 2025-04-29 PROCEDURE — 3079F DIAST BP 80-89 MM HG: CPT | Performed by: SPECIALIST

## 2025-04-29 PROCEDURE — 1036F TOBACCO NON-USER: CPT | Performed by: SPECIALIST

## 2025-04-29 PROCEDURE — 4010F ACE/ARB THERAPY RXD/TAKEN: CPT | Performed by: SPECIALIST

## 2025-04-29 PROCEDURE — 3075F SYST BP GE 130 - 139MM HG: CPT | Performed by: SPECIALIST

## 2025-04-29 PROCEDURE — 99214 OFFICE O/P EST MOD 30 MIN: CPT | Performed by: SPECIALIST

## 2025-04-29 RX ORDER — METHYLPREDNISOLONE 4 MG/1
TABLET ORAL
Qty: 21 TABLET | Refills: 0 | Status: SHIPPED | OUTPATIENT
Start: 2025-04-29 | End: 2025-05-05

## 2025-04-29 RX ORDER — CYCLOBENZAPRINE HCL 10 MG
10 TABLET ORAL NIGHTLY PRN
Qty: 20 TABLET | Refills: 0 | Status: SHIPPED | OUTPATIENT
Start: 2025-04-29

## 2025-04-29 ASSESSMENT — ENCOUNTER SYMPTOMS
CONSTITUTIONAL NEGATIVE: 1
BACK PAIN: 1

## 2025-04-29 NOTE — PROGRESS NOTES
Subjective   Patient ID: Smitha Frank is a 55 y.o. female. They present today with a chief complaint of Sciatica (Sciatica pain for 3 months).    History of Present Illness  HPI    Past Medical History  Allergies as of 04/29/2025 - Reviewed 04/29/2025   Allergen Reaction Noted    Macrolide antibiotics Shortness of breath, Hives, Nausea And Vomiting, and Unknown 08/11/2005    Penicillins Other, Shortness of breath, and Hives 08/11/2005    Sulfa (sulfonamide antibiotics) Shortness of breath 05/21/2014    Adhesive tape-silicones Unknown 08/27/2023    Bee venom protein (honey bee) Unknown 07/10/2023    Ciprofloxacin Unknown 11/20/2014    Erythromycin base Unknown 08/27/2023    Penicillin Unknown 02/12/2025       Prescriptions Prior to Admission[1]     Medical History[2]    Surgical History[3]     reports that she has never smoked. She has never used smokeless tobacco. She reports that she does not currently use alcohol. She reports that she does not use drugs.    Review of Systems  Review of Systems   Constitutional: Negative.    Musculoskeletal:  Positive for back pain.                                  Objective    Vitals:    04/29/25 1116   BP: 134/86   Pulse: 76   Resp: 18   Temp: 36.9 °C (98.4 °F)   SpO2: 96%     No LMP recorded. Patient is postmenopausal.    Physical Exam  Constitutional:       Appearance: Normal appearance.   Musculoskeletal:      Lumbar back: Tenderness present. Positive right straight leg raise test.      Comments: Tenderness on lower lumbar area   Skin:     Findings: No bruising, erythema, lesion or rash.   Neurological:      Mental Status: She is alert.         Procedures    Point of Care Test & Imaging Results from this visit  No results found for this visit on 04/29/25.   Imaging  No results found.    Cardiology, Vascular, and Other Imaging  No other imaging results found for the past 2 days      Diagnostic study results (if any) were reviewed by Quyen Howell MD.    Assessment/Plan    Allergies, medications, history, and pertinent labs/EKGs/Imaging reviewed by Quyen Howell MD.     Medical Decision Making   Sciatica of right side will be treated with Flexeril and Medrol dose, she will start Physical therapy on , also will follow up with Spine Specialist.    Orders and Diagnoses  There are no diagnoses linked to this encounter.    Medical Admin Record      Patient disposition: Home    Electronically signed by Quyen Howell MD  11:21 AM           [1] (Not in a hospital admission)  [2]   Past Medical History:  Diagnosis Date    Acute pharyngitis, unspecified 2014    Sore throat    Cough, unspecified 2014    Cough    Other abnormal and inconclusive findings on diagnostic imaging of breast 2015    Abnormal screening mammogram    Personal history of other benign neoplasm 2014    History of uterine leiomyoma    Personal history of other diseases of the respiratory system 2014    History of acute bronchitis    Unspecified asthma, uncomplicated ([facility]) 2022    Asthma   [3]   Past Surgical History:  Procedure Laterality Date    APPENDECTOMY  2014    Appendectomy    CARDIAC ELECTROPHYSIOLOGY PROCEDURE N/A 2025    Procedure: Cardioversion;  Surgeon: David Cabral MD;  Location: [place] [address] Cardiac Cath Lab;  Service: Cardiovascular;  Laterality: N/A;  CT Watchman 25 was negative for LA/SONIA thrombus    CARDIAC ELECTROPHYSIOLOGY PROCEDURE Right 3/28/2025    Procedure: Ablation Atrial Flutter;  Surgeon: Oneil Mack MD;  Location: [place] [address] Cardiac Cath Lab;  Service: Electrophysiology;  Laterality: Right;  VENDOR: [employer]     SECTION, CLASSIC  2014     Section    LAPAROSCOPY DIAGNOSTIC / BIOPSY / ASPIRATION / LYSIS  2014    Exploratory Laparoscopy    OTHER SURGICAL HISTORY  2014    Uterine Myomectomy Abdominal Approach    OTHER SURGICAL HISTORY  2014    Supracervical Hysterectomy Laparoscopic     OTHER SURGICAL HISTORY  08/18/2014    Gynecologic Serv Endometrial Cryoablation W/ Ultrason Guid    TUBAL LIGATION  08/18/2014    Tubal Ligation

## 2025-05-05 ENCOUNTER — OFFICE VISIT (OUTPATIENT)
Dept: ORTHOPEDIC SURGERY | Facility: CLINIC | Age: 55
End: 2025-05-05
Payer: COMMERCIAL

## 2025-05-05 ENCOUNTER — HOSPITAL ENCOUNTER (OUTPATIENT)
Dept: RADIOLOGY | Facility: CLINIC | Age: 55
Discharge: HOME | End: 2025-05-05
Payer: COMMERCIAL

## 2025-05-05 DIAGNOSIS — M54.16 LUMBAR RADICULOPATHY: Primary | ICD-10-CM

## 2025-05-05 DIAGNOSIS — M41.50 DEGENERATIVE SCOLIOSIS IN ADULT PATIENT: ICD-10-CM

## 2025-05-05 DIAGNOSIS — M54.16 LUMBAR RADICULOPATHY: ICD-10-CM

## 2025-05-05 DIAGNOSIS — M48.062 LUMBAR STENOSIS WITH NEUROGENIC CLAUDICATION: ICD-10-CM

## 2025-05-05 PROCEDURE — 4010F ACE/ARB THERAPY RXD/TAKEN: CPT | Performed by: PHYSICIAN ASSISTANT

## 2025-05-05 PROCEDURE — 99213 OFFICE O/P EST LOW 20 MIN: CPT | Performed by: PHYSICIAN ASSISTANT

## 2025-05-05 PROCEDURE — 3044F HG A1C LEVEL LT 7.0%: CPT | Performed by: PHYSICIAN ASSISTANT

## 2025-05-05 PROCEDURE — 72110 X-RAY EXAM L-2 SPINE 4/>VWS: CPT

## 2025-05-05 PROCEDURE — 72110 X-RAY EXAM L-2 SPINE 4/>VWS: CPT | Performed by: RADIOLOGY

## 2025-05-05 RX ORDER — GABAPENTIN 300 MG/1
300 CAPSULE ORAL 3 TIMES DAILY
Qty: 90 CAPSULE | Refills: 2 | Status: SHIPPED | OUTPATIENT
Start: 2025-05-05 | End: 2025-08-03

## 2025-05-05 RX ORDER — METHOCARBAMOL 500 MG/1
500 TABLET, FILM COATED ORAL 4 TIMES DAILY PRN
Qty: 40 TABLET | Refills: 0 | Status: SHIPPED | OUTPATIENT
Start: 2025-05-05 | End: 2025-05-15

## 2025-05-05 RX ORDER — GABAPENTIN 300 MG/1
300 CAPSULE ORAL 3 TIMES DAILY
Qty: 90 CAPSULE | Refills: 2 | Status: SHIPPED | OUTPATIENT
Start: 2025-05-05 | End: 2025-05-05

## 2025-05-05 RX ORDER — METHOCARBAMOL 500 MG/1
500 TABLET, FILM COATED ORAL 4 TIMES DAILY PRN
Qty: 40 TABLET | Refills: 0 | Status: SHIPPED | OUTPATIENT
Start: 2025-05-05 | End: 2025-05-05
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
## 2025-05-05 NOTE — Clinical Note
May 5, 2025     Patient: Smitha Frank   YOB: 1970   Date of Visit: 5/5/2025       To Whom It May Concern:    It is my medical opinion that Smitha Frank {Work release (duty restriction):27360}.    If you have any questions or concerns, please don't hesitate to call.         Sincerely,        Shania Schulte PA-C    CC: No Recipients

## 2025-05-05 NOTE — Clinical Note
May 5, 2025     Patient: Smitha Frank   YOB: 1970   Date of Visit: 5/5/2025       To Whom it May Concern:    Smitha Frank was seen in my clinic on 5/5/2025. She {Return to school/sport:88039}.    If you have any questions or concerns, please don't hesitate to call.         Sincerely,          Shania Schulte PA-C        CC: No Recipients

## 2025-05-12 ENCOUNTER — EVALUATION (OUTPATIENT)
Dept: PHYSICAL THERAPY | Facility: CLINIC | Age: 55
End: 2025-05-12
Payer: COMMERCIAL

## 2025-05-12 DIAGNOSIS — M54.41 BILATERAL LOW BACK PAIN WITH RIGHT-SIDED SCIATICA, UNSPECIFIED CHRONICITY: Primary | ICD-10-CM

## 2025-05-12 DIAGNOSIS — M54.16 LUMBAR RADICULOPATHY: ICD-10-CM

## 2025-05-12 PROCEDURE — 97110 THERAPEUTIC EXERCISES: CPT | Mod: GP | Performed by: PHYSICAL THERAPIST

## 2025-05-12 PROCEDURE — 97140 MANUAL THERAPY 1/> REGIONS: CPT | Mod: GP | Performed by: PHYSICAL THERAPIST

## 2025-05-12 PROCEDURE — 97161 PT EVAL LOW COMPLEX 20 MIN: CPT | Mod: GP | Performed by: PHYSICAL THERAPIST

## 2025-05-12 ASSESSMENT — ENCOUNTER SYMPTOMS
OCCASIONAL FEELINGS OF UNSTEADINESS: 0
LOSS OF SENSATION IN FEET: 1
DEPRESSION: 0
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
## 2025-05-13 ENCOUNTER — OFFICE VISIT (OUTPATIENT)
Dept: CARDIOLOGY | Facility: CLINIC | Age: 55
End: 2025-05-13
Payer: MEDICARE

## 2025-05-13 VITALS
DIASTOLIC BLOOD PRESSURE: 86 MMHG | SYSTOLIC BLOOD PRESSURE: 121 MMHG | WEIGHT: 260 LBS | HEIGHT: 65 IN | OXYGEN SATURATION: 95 % | BODY MASS INDEX: 43.32 KG/M2 | HEART RATE: 71 BPM

## 2025-05-13 DIAGNOSIS — I48.92 ATRIAL FLUTTER WITH RAPID VENTRICULAR RESPONSE (MULTI): ICD-10-CM

## 2025-05-13 LAB
ATRIAL RATE: 71 BPM
P AXIS: 73 DEGREES
P OFFSET: 202 MS
P ONSET: 140 MS
PR INTERVAL: 154 MS
Q ONSET: 217 MS
QRS COUNT: 12 BEATS
QRS DURATION: 84 MS
QT INTERVAL: 388 MS
QTC CALCULATION(BAZETT): 421 MS
QTC FREDERICIA: 410 MS
R AXIS: 77 DEGREES
T AXIS: 67 DEGREES
T OFFSET: 411 MS
VENTRICULAR RATE: 71 BPM

## 2025-05-13 PROCEDURE — 1036F TOBACCO NON-USER: CPT | Performed by: INTERNAL MEDICINE

## 2025-05-13 PROCEDURE — 99212 OFFICE O/P EST SF 10 MIN: CPT | Mod: 25

## 2025-05-13 PROCEDURE — 3044F HG A1C LEVEL LT 7.0%: CPT | Performed by: INTERNAL MEDICINE

## 2025-05-13 PROCEDURE — 3074F SYST BP LT 130 MM HG: CPT | Performed by: INTERNAL MEDICINE

## 2025-05-13 PROCEDURE — 3079F DIAST BP 80-89 MM HG: CPT | Performed by: INTERNAL MEDICINE

## 2025-05-13 PROCEDURE — 4010F ACE/ARB THERAPY RXD/TAKEN: CPT | Performed by: INTERNAL MEDICINE

## 2025-05-13 PROCEDURE — 93005 ELECTROCARDIOGRAM TRACING: CPT | Performed by: INTERNAL MEDICINE

## 2025-05-13 PROCEDURE — 3008F BODY MASS INDEX DOCD: CPT | Performed by: INTERNAL MEDICINE

## 2025-05-13 PROCEDURE — 93010 ELECTROCARDIOGRAM REPORT: CPT | Performed by: INTERNAL MEDICINE

## 2025-05-13 PROCEDURE — 99214 OFFICE O/P EST MOD 30 MIN: CPT | Performed by: INTERNAL MEDICINE

## 2025-05-13 ASSESSMENT — COLUMBIA-SUICIDE SEVERITY RATING SCALE - C-SSRS
6. HAVE YOU EVER DONE ANYTHING, STARTED TO DO ANYTHING, OR PREPARED TO DO ANYTHING TO END YOUR LIFE?: NO
1. IN THE PAST MONTH, HAVE YOU WISHED YOU WERE DEAD OR WISHED YOU COULD GO TO SLEEP AND NOT WAKE UP?: NO
2. HAVE YOU ACTUALLY HAD ANY THOUGHTS OF KILLING YOURSELF?: NO

## 2025-05-13 ASSESSMENT — ENCOUNTER SYMPTOMS
OCCASIONAL FEELINGS OF UNSTEADINESS: 0
DEPRESSION: 0
LOSS OF SENSATION IN FEET: 0

## 2025-05-13 ASSESSMENT — PAIN SCALES - GENERAL: PAINLEVEL_OUTOF10: 0-NO PAIN

## 2025-05-13 NOTE — PROGRESS NOTES
Referred by Oneil Macdonald MD provider found for   Chief Complaint   Patient presents with    Atrial Flutter        Smitha Frank is a 55 y.o. year old female patient with h/o HFpEF, Asthma, Obesity. Recently diagnosed with typical A Flutter and is currently 6 weeks post RF ablation. Presents for follow up.     PMHx/PSHx: As above    FamHx: unremarkable     Allergies:  RX Allergies[1]     Review of Systems    Constitutional: not feeling tired.   Eyes: no eyesight problems.   ENT: no hearing loss and no nosebleeds.   Cardiovascular: no intermittent leg claudication and as noted in HPI.   Respiratory: no chronic cough and no shortness of breath.   Gastrointestinal: no change in bowel habits and no blood in stools.   Genitourinary: no urinary frequency and no hematuria.   Skin: no skin rashes.   Neurological: no seizures and no frequent falls.   Psychiatric: no depression and not suicidal.   All other systems have been reviewed and are negative for complaint.     Outpatient Medications:  Current Outpatient Medications   Medication Instructions    acetaminophen (TYLENOL) 650 mg, Every 4 hours PRN    albuterol 90 mcg/actuation inhaler 2 puffs, Every 4 hours PRN    albuterol 2.5 mg, Every 4 hours PRN    ascorbic acid (Vitamin C) 500 mg chewable tablet 1 tablet, Daily    atorvastatin (Lipitor) 20 mg tablet 1 tablet, Daily (0630)    atorvastatin (LIPITOR) 20 mg, oral, Daily    blood sugar diagnostic (Accu-Chek Guide test strips) strip TEST twice a day    Breo Ellipta 100-25 mcg/dose inhaler 1 puff, Daily    cholecalciferol (VITAMIN D-3) 50,000 Units, Every 14 days    cholecalciferol (VITAMIN D-3) 125 mcg, Daily    desonide (DesOwen) 0.05 % cream     diphenhydrAMINE (BENADRYL) 50 mg, Daily PRN    Eliquis 5 mg, oral, Every 12 hours    fluticasone (Flonase) 50 mcg/actuation nasal spray 4 sprays, 3 times daily    furosemide (LASIX) 20 mg, oral, 2 times daily    gabapentin (NEURONTIN) 300 mg, oral, 3 times daily     "gentamicin (Garamycin) 0.1 % cream     ipratropium-albuteroL (Duo-Neb) 0.5-2.5 mg/3 mL nebulizer solution 3 mL, Every 6 hours    latanoprost (Xalatan) 0.005 % ophthalmic solution 1 drop, Nightly    lidocaine 4 % patch 1 patch, transdermal, Every 24 hours, Remove & discard patch within 12 hours or as directed by MD.    losartan (COZAAR) 100 mg, oral, Daily    magnesium gluconate (Magonate) 27.5 mg magne- sium (500 mg) tablet 200 mg, 2 times daily    meloxicam (MOBIC) 15 mg, oral, Daily    metFORMIN (GLUCOPHAGE) 1,000 mg, 2 times daily (morning and late afternoon)    methocarbamol (ROBAXIN) 500 mg, oral, 4 times daily PRN    metoprolol succinate XL (TOPROL-XL) 100 mg, oral, Daily, Do not crush or chew.    montelukast (SINGULAIR) 10 mg, oral, Nightly, ----MUST MAKE APPT    Mounjaro 15 mg, Weekly    multivitamin tablet 1 tablet, oral, Daily    multivitamin with minerals tablet 1 tablet, Daily    naproxen (NAPROSYN) 500 mg, oral, Every 12 hours    omega 3-dha-epa-fish oil (Fish OiL) 1,000 (120-180) mg capsule 1 capsule, Daily    potassium chloride CR 20 mEq ER tablet 20 mEq, oral, Daily, ----MUST MAKE APPT FOR FURTHER REFILLS    travoprost (Travatan Z) 0.004 % drops ophthalmic solution INSTILL 1 DROP IN BOTH EYES EVERY NIGHT         Last Recorded Vitals:      2/15/2025     6:41 AM 2/15/2025     8:30 AM 2/25/2025     9:00 AM 3/28/2025    10:00 AM 4/7/2025     9:11 AM 4/29/2025    11:16 AM 5/13/2025    10:44 AM   Vitals   Systolic  129 119  135 134    Diastolic  89 78  85 86    BP Location   Right arm  Right arm     Heart Rate  95 71  96 76    Temp  36.8 °C (98.2 °F)   36.2 °C (97.2 °F) 36.9 °C (98.4 °F)    Resp  20    18    Height    1.676 m (5' 6\") 1.676 m (5' 6\")  1.651 m (5' 5\")   Weight (lb) 274.03   266 267.2  260   BMI 45.6 kg/m2   42.93 kg/m2 43.13 kg/m2  43.27 kg/m2   BSA (m2) 2.38 m2   2.37 m2 2.37 m2  2.33 m2   Visit Report   Report  Report Report Report    Visit Vitals  Ht 1.651 m (5' 5\")   Wt 118 kg (260 lb) "   BMI 43.27 kg/m²   OB Status Postmenopausal   Smoking Status Never   BSA 2.33 m²        Physical Exam:  Constitutional: alert and in no acute distress.   Eyes: no erythema, swelling or discharge from the eye .   Neck: neck is supple, symmetric, trachea midline, no masses  and no thyromegaly .   Pulmonary: no increased work of breathing or signs of respiratory distress  and lungs clear to auscultation.    Cardiovascular: carotid pulses 2+ bilaterally with no bruit , JVP was normal, no thrills , regular rhythm, normal S1 and S2, no murmurs , pedal pulses 2+ bilaterally  and no edema .   Abdomen: abdomen non-tender, no masses  and no hepatomegaly .   Skin: skin warm and dry, normal skin turgor .   Psychiatric judgment and insight is normal  and oriented to person, place and time .        Assessment/Plan   Problem List Items Addressed This Visit           ICD-10-CM    Atrial flutter with rapid ventricular response (Multi) I48.92    Relevant Orders    ECG 12 lead (Clinic Performed)       Smitha Frank is a 55 y.o. year old female patient with h/o HFpEF, Asthma, Obesity. Recently diagnosed with typical A Flutter and is currently 6 weeks post RF ablation. Presents for follow up.   Her current ECG shows NSR with narrow QRS and HR of 71 bpm. The patient reports doing well and denies symptoms. The patient [id number] score is 1 (female). Will stop the Eliquis today and replace with ASA 81 mg a day for a month. Will continue the metoprolol. Will follow up in 6 months.         Oneil Mack MD  Cardiac Electrophysiology      Thank you very much for allowing me to participate in the care of this pleasant patient. Please do not hesitate to contact me with any further questions or concerns regarding his care.    **Disclaimer: This note was dictated by speech recognition, and every effort has been made to prevent any error in transcription, however minor errors may be present**         [1]   Allergies  Allergen Reactions     Macrolide Antibiotics Shortness of breath, Hives, Nausea And Vomiting and Unknown    Penicillins Other, Shortness of breath and Hives    Sulfa (Sulfonamide Antibiotics) Shortness of breath    Adhesive Tape-Silicones Unknown    Bee Venom Protein (Honey Bee) Unknown    Ciprofloxacin Unknown    Erythromycin Base Unknown    Penicillin Unknown

## 2025-05-20 ENCOUNTER — OFFICE VISIT (OUTPATIENT)
Dept: PAIN MEDICINE | Facility: HOSPITAL | Age: 55
End: 2025-05-20
Payer: COMMERCIAL

## 2025-05-20 ENCOUNTER — TREATMENT (OUTPATIENT)
Dept: PHYSICAL THERAPY | Facility: CLINIC | Age: 55
End: 2025-05-20
Payer: COMMERCIAL

## 2025-05-20 DIAGNOSIS — M48.062 LUMBAR STENOSIS WITH NEUROGENIC CLAUDICATION: ICD-10-CM

## 2025-05-20 DIAGNOSIS — M54.50 LOWER BACK PAIN: ICD-10-CM

## 2025-05-20 PROCEDURE — 99214 OFFICE O/P EST MOD 30 MIN: CPT | Performed by: ANESTHESIOLOGY

## 2025-05-20 PROCEDURE — 99204 OFFICE O/P NEW MOD 45 MIN: CPT | Performed by: ANESTHESIOLOGY

## 2025-05-20 PROCEDURE — 4010F ACE/ARB THERAPY RXD/TAKEN: CPT | Performed by: ANESTHESIOLOGY

## 2025-05-20 PROCEDURE — 97110 THERAPEUTIC EXERCISES: CPT | Mod: GP | Performed by: PHYSICAL THERAPIST

## 2025-05-20 PROCEDURE — 3044F HG A1C LEVEL LT 7.0%: CPT | Performed by: ANESTHESIOLOGY

## 2025-05-20 RX ORDER — GABAPENTIN 300 MG/1
CAPSULE ORAL
Qty: 180 CAPSULE | Refills: 2 | Status: SHIPPED | OUTPATIENT
Start: 2025-05-20

## 2025-05-20 ASSESSMENT — PAIN SCALES - GENERAL: PAINLEVEL_OUTOF10: 7
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
## 2025-05-27 ENCOUNTER — TREATMENT (OUTPATIENT)
Dept: PHYSICAL THERAPY | Facility: CLINIC | Age: 55
End: 2025-05-27
Payer: COMMERCIAL

## 2025-05-27 DIAGNOSIS — M54.50 LOWER BACK PAIN: ICD-10-CM

## 2025-05-27 PROCEDURE — 97140 MANUAL THERAPY 1/> REGIONS: CPT | Mod: GP | Performed by: PHYSICAL THERAPIST

## 2025-05-27 PROCEDURE — 97110 THERAPEUTIC EXERCISES: CPT | Mod: GP | Performed by: PHYSICAL THERAPIST
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
## 2025-06-03 ENCOUNTER — APPOINTMENT (OUTPATIENT)
Dept: PHYSICAL THERAPY | Facility: CLINIC | Age: 55
End: 2025-06-03
Payer: COMMERCIAL

## 2025-06-04 ENCOUNTER — HOSPITAL ENCOUNTER (OUTPATIENT)
Dept: RADIOLOGY | Facility: CLINIC | Age: 55
Discharge: HOME | End: 2025-06-04
Payer: MEDICARE

## 2025-06-04 DIAGNOSIS — M48.062 LUMBAR STENOSIS WITH NEUROGENIC CLAUDICATION: ICD-10-CM

## 2025-06-04 PROCEDURE — 72148 MRI LUMBAR SPINE W/O DYE: CPT

## 2025-06-10 ENCOUNTER — TREATMENT (OUTPATIENT)
Dept: PHYSICAL THERAPY | Facility: CLINIC | Age: 55
End: 2025-06-10
Payer: COMMERCIAL

## 2025-06-10 DIAGNOSIS — M54.50 LOWER BACK PAIN: ICD-10-CM

## 2025-06-10 PROCEDURE — 97110 THERAPEUTIC EXERCISES: CPT | Mod: GP,CQ

## 2025-06-10 NOTE — PROGRESS NOTES
Physical Therapy    Physical Therapy Treatment    Patient Name: Smitha Frank  MRN: [id number]  Encounter Date: 6/10/2025  Visit #4  Payor: [employer] / Plan: [employer] / Product Type: *No Product type* /   NO AUTH,  250 DED-MET, 80% COVERAGE, 30V PT, 1500 OOP-MET,   Time Calculation  Start Time: 0150  Stop Time: 0230  Time Calculation (min): 40 min        PT Therapeutic Procedures Time Entry  Therapeutic Exercise Time Entry: 40        Primary Dx: Low back pain    Current Problem  Problem List Items Addressed This Visit    None  Visit Diagnoses         Codes      Lower back pain     M54.50          Assessment:   Tolerated treatment today without painful increase. Sensitivity to TPR of piriformis muscle requiring adjustment of deep pressure. Understands therex well and demonstrates quick grasp. Patient will benefit from skilled PT to increase her core and LE strength, improve her body mechanics and decrease her pain.    Plan:     OP PT Plan  Treatment/Interventions: Education/ Instruction, Hot pack, Manual therapy, Neuromuscular re-education, Therapeutic activities, Therapeutic exercises  PT Plan: Skilled PT  PT Frequency: 1 time per week  Duration: 6 visits  Onset Date: 02/12/25  Certification Period Start Date: 05/12/25  Certification Period End Date: 08/10/25  Rehab Potential: Good  Plan of Care Agreement: Patient     General     Referred by: Mario Cramer    Subjective   Performing some of my exercises daily and stretching around the house  Currently some pain in the lower back and glute 6-7/10    Precautions: none        Objective:  Not assessed this date.      Treatment:  Therapeutic Exercises x 40 minutes   Stepper x 5 min  Seated flexion supported on theraball x 15  Seated hamstring stretch 2 x 30 sec  Piriformis stretch 3 x 30 sec  HL pelvic tilt x 20  HL lower trunk rotations x 20  KTC 10 x 5 sec each leg    Manual Therapy x 5 min  TP release of piriformis    Not Completed  "Today:  MFR of R low back, glute, ITB  DKTC with swissball 2 x 10   PPT 2 x 10   Lumbar Flexion with swiss ball is 10 x     HEP:  Piriformis stretch 3 x 30\"  PPT 2 x 10     Goals:   Active       PT Problem       HEP       Start:  05/12/25    Expected End:  06/16/25       Patient will be independent with HEP and self management of condition         ROM       Start:  05/12/25    Expected End:  06/16/25       Patient will increase lumbar ROM to WNL and without radicular pain to functionally roll over in bed and sleep throughout the night.          Strength       Start:  05/12/25    Expected End:  07/14/25       Patient will increase R hip strength to 5/5 on MMT with 0/10 pain to functionally improve hip and low back stability with standing and walking at work.          Outcomes       Start:  05/12/25    Expected End:  07/14/25       Patient will improve STEWART score to <20% to demonstrate an improvement in her pain with daily activities.          Function       Start:  05/12/25    Expected End:  07/14/25       Patient will complete 8\" step ups x 10 with 0/10 pain in the B hips to improve ability to complete stair navigation at work and home.                    "

## 2025-06-17 ENCOUNTER — TREATMENT (OUTPATIENT)
Dept: PHYSICAL THERAPY | Facility: CLINIC | Age: 55
End: 2025-06-17
Payer: COMMERCIAL

## 2025-06-17 DIAGNOSIS — M54.16 LUMBAR RADICULOPATHY: ICD-10-CM

## 2025-06-17 DIAGNOSIS — M54.50 LOWER BACK PAIN: ICD-10-CM

## 2025-06-17 PROCEDURE — 97110 THERAPEUTIC EXERCISES: CPT | Mod: GP,CQ

## 2025-06-17 NOTE — PROGRESS NOTES
Physical Therapy    Physical Therapy Treatment    Patient Name: Smitha Frank  MRN: [id number]  Encounter Date: 6/17/2025  Visit #5  Payor: [employer] / Plan: [employer] / Product Type: *No Product type* /   NO AUTH,  250 DED-MET, 80% COVERAGE, 30V PT, 1500 OOP-MET,   Time Calculation  Start Time: 0145  Stop Time: 0230  Time Calculation (min): 45 min        PT Therapeutic Procedures Time Entry  Therapeutic Exercise Time Entry: 45        Primary Dx: Low back pain    Current Problem  Problem List Items Addressed This Visit    None  Visit Diagnoses         Codes      Lower back pain     M54.50          Assessment:   Additional extension therex added today, completed without painful increase; reviewed HEP and peripheralization/centralization of symptoms. Repeated therex with good carry over from previous session.     Plan:     OP PT Plan  Treatment/Interventions: Education/ Instruction, Hot pack, Manual therapy, Neuromuscular re-education, Therapeutic activities, Therapeutic exercises  PT Plan: Skilled PT  PT Frequency: 1 time per week  Duration: 6 visits  Onset Date: 02/12/25  Certification Period Start Date: 05/12/25  Certification Period End Date: 08/10/25  Rehab Potential: Good  Plan of Care Agreement: Patient     General     Referred by: Mario Cramer    Subjective   The pain in my lower back has continued to reach 6-7/10, although recently it how lowered to around a 3/10 more often than before  I have been getting frequent muscle spasms in my right calf and glutes  Continues to complete HEP daily    Precautions: none        Objective:  Not assessed this date.      Treatment:  Therapeutic Exercises x 45 minutes   Stepper x 5 min  Standing trunk rotations with theraball x 20  Seated hamstring stretch 2 x 30 sec  Standing side bends to left x 20  Standing hips supporter trunk ext x 20  Prone on elbows x 1 min  Prone props from elbows x 15  Prone hip ext x 10 each side  Piriformis stretch  "3 x 30 sec  HL ppelvic tilt x 20  HL lower trunk rotations x 20    Manual Therapy   TP release of piriformis    Not Completed Today:  MFR of R low back, glute, ITB  DKTC with swissball 2 x 10   PPT 2 x 10   Lumbar Flexion with swiss ball is 10 x     HEP:  Piriformis stretch 3 x 30\"  PPT 2 x 10     Goals:   Active       PT Problem       HEP       Start:  05/12/25    Expected End:  06/16/25       Patient will be independent with HEP and self management of condition         ROM       Start:  05/12/25    Expected End:  06/16/25       Patient will increase lumbar ROM to WNL and without radicular pain to functionally roll over in bed and sleep throughout the night.          Strength       Start:  05/12/25    Expected End:  07/14/25       Patient will increase R hip strength to 5/5 on MMT with 0/10 pain to functionally improve hip and low back stability with standing and walking at work.          Outcomes       Start:  05/12/25    Expected End:  07/14/25       Patient will improve STEWART score to <20% to demonstrate an improvement in her pain with daily activities.          Function       Start:  05/12/25    Expected End:  07/14/25       Patient will complete 8\" step ups x 10 with 0/10 pain in the B hips to improve ability to complete stair navigation at work and home.             "

## 2025-06-23 ENCOUNTER — OFFICE VISIT (OUTPATIENT)
Facility: HOSPITAL | Age: 55
End: 2025-06-23
Payer: COMMERCIAL

## 2025-06-23 VITALS
BODY MASS INDEX: 46.32 KG/M2 | HEART RATE: 84 BPM | TEMPERATURE: 96 F | WEIGHT: 278 LBS | OXYGEN SATURATION: 93 % | SYSTOLIC BLOOD PRESSURE: 132 MMHG | HEIGHT: 65 IN | DIASTOLIC BLOOD PRESSURE: 81 MMHG

## 2025-06-23 DIAGNOSIS — E11.9 CONTROLLED TYPE 2 DIABETES MELLITUS WITHOUT COMPLICATION, WITHOUT LONG-TERM CURRENT USE OF INSULIN: Primary | ICD-10-CM

## 2025-06-23 DIAGNOSIS — M54.32 SCIATICA OF LEFT SIDE: ICD-10-CM

## 2025-06-23 PROCEDURE — 3044F HG A1C LEVEL LT 7.0%: CPT | Performed by: FAMILY MEDICINE

## 2025-06-23 PROCEDURE — 3008F BODY MASS INDEX DOCD: CPT | Performed by: FAMILY MEDICINE

## 2025-06-23 PROCEDURE — 99213 OFFICE O/P EST LOW 20 MIN: CPT | Performed by: FAMILY MEDICINE

## 2025-06-23 PROCEDURE — 4010F ACE/ARB THERAPY RXD/TAKEN: CPT | Performed by: FAMILY MEDICINE

## 2025-06-23 PROCEDURE — 3075F SYST BP GE 130 - 139MM HG: CPT | Performed by: FAMILY MEDICINE

## 2025-06-23 PROCEDURE — 1036F TOBACCO NON-USER: CPT | Performed by: FAMILY MEDICINE

## 2025-06-23 PROCEDURE — 3079F DIAST BP 80-89 MM HG: CPT | Performed by: FAMILY MEDICINE

## 2025-06-23 RX ORDER — NAPROXEN 500 MG/1
500 TABLET ORAL EVERY 12 HOURS
Qty: 60 TABLET | Refills: 2 | Status: SHIPPED | OUTPATIENT
Start: 2025-06-23 | End: 2025-09-21

## 2025-06-23 RX ORDER — TIRZEPATIDE 15 MG/.5ML
15 INJECTION, SOLUTION SUBCUTANEOUS WEEKLY
Qty: 2 ML | Refills: 11 | Status: SHIPPED | OUTPATIENT
Start: 2025-06-23 | End: 2026-06-23

## 2025-06-23 RX ORDER — TRAMADOL HYDROCHLORIDE 50 MG/1
TABLET, FILM COATED ORAL
COMMUNITY
Start: 2025-04-15

## 2025-06-23 ASSESSMENT — ENCOUNTER SYMPTOMS
POLYDIPSIA: 0
BACK PAIN: 1
POLYPHAGIA: 0

## 2025-06-23 ASSESSMENT — PAIN SCALES - GENERAL: PAINLEVEL_OUTOF10: 9

## 2025-06-23 NOTE — PROGRESS NOTES
"Subjective   Patient ID: Smitha Frank is a 55 y.o. female who presents for Follow-up.    Smitha is still struggling with chronic low back pain. She is scheduled for a lumbar injection on July 7.  Gabapentin is providing incomplete relief. She has no symptoms of diabetes, but her weight remains stubbornly high which she attributes to a lack of activity and a burst of steroids she received for low back pain. She takes Mounjaro 15mg weekly along with metformin.        Review of Systems   Endocrine: Negative for polydipsia, polyphagia and polyuria.   Musculoskeletal:  Positive for back pain.     Objective   /81 (BP Location: Right arm, Patient Position: Sitting)   Pulse 84   Temp 35.6 °C (96 °F) (Temporal)   Ht 1.651 m (5' 5\")   Wt 126 kg (278 lb)   SpO2 93%   BMI 46.26 kg/m²     Physical Exam  Constitutional:       Appearance: She is obese.   Cardiovascular:      Rate and Rhythm: Normal rate and regular rhythm.   Pulmonary:      Effort: Pulmonary effort is normal.      Breath sounds: Normal breath sounds.     Assessment/Plan :  Reenforced all four [employer] goals. To continue Mounajro. HbA1c% today. FU roughly 2 months.          "

## 2025-06-24 ENCOUNTER — TREATMENT (OUTPATIENT)
Dept: PHYSICAL THERAPY | Facility: CLINIC | Age: 55
End: 2025-06-24
Payer: COMMERCIAL

## 2025-06-24 DIAGNOSIS — M54.50 LOWER BACK PAIN: ICD-10-CM

## 2025-06-24 PROCEDURE — 97110 THERAPEUTIC EXERCISES: CPT | Mod: GP | Performed by: PHYSICAL THERAPIST

## 2025-06-24 PROCEDURE — 97140 MANUAL THERAPY 1/> REGIONS: CPT | Mod: GP | Performed by: PHYSICAL THERAPIST

## 2025-06-24 NOTE — PROGRESS NOTES
Physical Therapy    Physical Therapy Treatment/Recheck    Patient Name: Smitha Frank  MRN: [id number]  Encounter Date: 6/24/2025  Visit 6    Payor: [employer] / Plan: [employer] / Product Type: *No Product type* /   NO AUTH,  250 DED-MET, 80% COVERAGE, 30V PT, 1500 OOP-MET,   Time Calculation  Start Time: 1351  Stop Time: 1430  Time Calculation (min): 39 min        PT Therapeutic Procedures Time Entry  Therapeutic Exercise Time Entry: 23  Manual Therapy Time Entry: 16        Primary Dx: Low back pain    Current Problem  Problem List Items Addressed This Visit    None  Visit Diagnoses         Codes      Lower back pain     M54.50          Assessment:   Patient presents to the clinic with radicular pain to R LE.   Patient's pain was improving, but she had a flare-up over the weekend with her pain. Patient continues to get relief with prone and extension exercises, so they were continued.  TP release was completed on the piriformis to improve flexibility and decrease muscle guarding along the sciatic nerve.  Patient will benefit from skilled PT to increase her core and LE strength, improve her hip flexibility and decrease her radicular pain.      Plan:     OP PT Plan  Treatment/Interventions: Education/ Instruction, Hot pack, Manual therapy, Neuromuscular re-education, Therapeutic activities, Therapeutic exercises  PT Plan: Skilled PT  PT Frequency: 1 time per week  Duration: 6 visits + 6 additional visits  Onset Date: 02/12/25  Certification Period Start Date: 05/12/25  Certification Period End Date: 08/10/25  Rehab Potential: Good  Plan of Care Agreement: Patient     General     Referred by: Mario Cramer    Subjective   Patient notes that this weekend was bad in terms of pain.  Patient is taking pain medication 3x/day.  Patient notes that the pain is now radiating down her entire leg.  Patient rates pain at 5/10 currently.  Patient notes that she is scheduled to have an injection on  "7/7.      Precautions: none        Objective:  Outcome Measures:   STEWART: 42%     Posture: lay-back posture in standing      Palpation: Tenderness upon palpation at L5 and R piriformis     Gait: Patient ambulated into the clinic with a normal gait and without an AD        Lumbar AROM: Repeated motion in standing    Degrees/Motion Symptom Change   Flexion 60 deg Pain in the calf   Extension WNL Pain in the low back/buttock   RSB WNL     LSB WNL Pain in the R hip    Rrot WNL     Lrot WNL           Lower Extremity Strength:  Date: eval RIGHT LEFT   Hip Flexion 4/5  5/5   Hip Extension 3+/5 4/5   Hip Abduction 5/5 5/5   Hip Adduction 5/5 5/5   Knee Extension 5/5 5/5   Knee Flexion 5/5 5/5   Ankle DF 5/5 5/5   Ankle PF 5/5 5/5   Ankle INV 5/5 5/5   Ankle EV 5/5 5/5      Lower Extremity ROM:  Date: eval RIGHT LEFT   Hip Flexion       Hip Extension       Hip IR 15 deg 25 deg   Hip ER WNL WNL   Knee Extension       Knee Flexion       Ankle DF       Ankle PF       Ankle INV       Ankle EV          Sensation: intact light touch sensation     Special Tests:  +slump test on the R; - slump test on the L     Treatment:  Therapeutic Exercises x 23 minutes   Prone on elbows x 1 min  Prone hamstring curls 2 x 10 ea  Prone Heel squeeze 2 x 10   Standing hip ext 2 x 10 each   Standing hip abduction 1 x 5 ea  Piriformis stretch 3 x 30 sec R only in prone      Manual Therapy x 16 minutes  TP release of piriformis      HEP:  Piriformis stretch 3 x 30\"  PPT 2 x 10     Goals:   Active       PT Problem       HEP (Progressing)       Start:  05/12/25    Expected End:  07/14/25       Patient will be independent with HEP and self management of condition      Goal Note       Completing some exercises at home              ROM (Progressing)       Start:  05/12/25    Expected End:  07/14/25       Patient will increase lumbar ROM to WNL and without radicular pain to functionally roll over in bed and sleep throughout the night.       Goal Note       " "Continued pain with flexion              Strength (Progressing)       Start:  05/12/25    Expected End:  07/14/25       Patient will increase R hip strength to 5/5 on MMT with 0/10 pain to functionally improve hip and low back stability with standing and walking at work.       Goal Note       Improving, see objective              Outcomes (Not Progressing)       Start:  05/12/25    Expected End:  07/14/25       Patient will improve STEWART score to <20% to demonstrate an improvement in her pain with daily activities.          Function (Not Progressing)       Start:  05/12/25    Expected End:  07/14/25       Patient will complete 8\" step ups x 10 with 0/10 pain in the B hips to improve ability to complete stair navigation at work and home.        Goal Note       Not completed at this time due to high pain levels                "

## 2025-07-01 ENCOUNTER — DOCUMENTATION (OUTPATIENT)
Dept: PHYSICAL THERAPY | Facility: CLINIC | Age: 55
End: 2025-07-01
Payer: COMMERCIAL

## 2025-07-03 ENCOUNTER — APPOINTMENT (OUTPATIENT)
Dept: PAIN MEDICINE | Facility: HOSPITAL | Age: 55
End: 2025-07-03
Payer: COMMERCIAL

## 2025-07-03 NOTE — PROGRESS NOTES
Physical Therapy                 Therapy Communication Note    Patient Name: Smitha Frank  MRN: [id number]  Department:   Room: Room/bed info not found  Today's Date: 7/3/2025     Discipline: Physical Therapy    Missed Visit:       Missed Visit Reason:  no show    Missed Time: No Show    Comment:

## 2025-07-07 ENCOUNTER — HOSPITAL ENCOUNTER (OUTPATIENT)
Facility: HOSPITAL | Age: 55
Discharge: HOME | End: 2025-07-07
Payer: COMMERCIAL

## 2025-07-07 VITALS
OXYGEN SATURATION: 98 % | HEART RATE: 81 BPM | TEMPERATURE: 97.5 F | SYSTOLIC BLOOD PRESSURE: 121 MMHG | RESPIRATION RATE: 18 BRPM | DIASTOLIC BLOOD PRESSURE: 81 MMHG

## 2025-07-07 DIAGNOSIS — M54.16 LUMBAR RADICULOPATHY: ICD-10-CM

## 2025-07-07 PROCEDURE — 7100000009 HC PHASE TWO TIME - INITIAL BASE CHARGE

## 2025-07-07 PROCEDURE — 7100000010 HC PHASE TWO TIME - EACH INCREMENTAL 1 MINUTE

## 2025-07-07 PROCEDURE — 62323 NJX INTERLAMINAR LMBR/SAC: CPT | Performed by: ANESTHESIOLOGY

## 2025-07-07 PROCEDURE — 99152 MOD SED SAME PHYS/QHP 5/>YRS: CPT

## 2025-07-07 PROCEDURE — 2500000004 HC RX 250 GENERAL PHARMACY W/ HCPCS (ALT 636 FOR OP/ED): Mod: JZ | Performed by: ANESTHESIOLOGY

## 2025-07-07 PROCEDURE — 2550000001 HC RX 255 CONTRASTS: Performed by: ANESTHESIOLOGY

## 2025-07-07 PROCEDURE — 3700000012 HC SEDATION LEVEL 5+ TIME - INITIAL 15 MINUTES 5/> YEARS

## 2025-07-07 RX ORDER — METHYLPREDNISOLONE ACETATE 40 MG/ML
INJECTION, SUSPENSION INTRA-ARTICULAR; INTRALESIONAL; INTRAMUSCULAR; SOFT TISSUE
Status: COMPLETED | OUTPATIENT
Start: 2025-07-07 | End: 2025-07-07

## 2025-07-07 RX ORDER — MIDAZOLAM HYDROCHLORIDE 1 MG/ML
INJECTION, SOLUTION INTRAMUSCULAR; INTRAVENOUS
Status: COMPLETED | OUTPATIENT
Start: 2025-07-07 | End: 2025-07-07

## 2025-07-07 RX ORDER — LIDOCAINE HYDROCHLORIDE 5 MG/ML
INJECTION, SOLUTION INFILTRATION; INTRAVENOUS
Status: COMPLETED | OUTPATIENT
Start: 2025-07-07 | End: 2025-07-07

## 2025-07-07 RX ADMIN — METHYLPREDNISOLONE ACETATE 40 MG: 40 INJECTION, SUSPENSION INTRA-ARTICULAR; INTRALESIONAL; INTRAMUSCULAR; SOFT TISSUE at 10:54

## 2025-07-07 RX ADMIN — IOHEXOL 3 ML: 240 INJECTION, SOLUTION INTRATHECAL; INTRAVASCULAR; INTRAVENOUS; ORAL at 10:54

## 2025-07-07 RX ADMIN — LIDOCAINE HYDROCHLORIDE 12 ML: 5 INJECTION, SOLUTION INFILTRATION at 10:54

## 2025-07-07 RX ADMIN — MIDAZOLAM 2 MG: 1 INJECTION INTRAMUSCULAR; INTRAVENOUS at 10:45

## 2025-07-07 ASSESSMENT — PAIN SCALES - GENERAL
PAINLEVEL_OUTOF10: 8
PAINLEVEL_OUTOF10: 6
PAINLEVEL_OUTOF10: 3
PAINLEVEL_OUTOF10: 5 - MODERATE PAIN
PAINLEVEL_OUTOF10: 3

## 2025-07-07 ASSESSMENT — PAIN - FUNCTIONAL ASSESSMENT
PAIN_FUNCTIONAL_ASSESSMENT: WONG-BAKER FACES
PAIN_FUNCTIONAL_ASSESSMENT: 0-10
PAIN_FUNCTIONAL_ASSESSMENT: WONG-BAKER FACES

## 2025-07-07 NOTE — H&P
Pain Management H&P    History Of Present Illness  Smitha Frank is a 55 y.o. female presents for procedure stated below. Endorses no changes in past medical history or medical health since last seen in clinic.      Past Medical History  She has a past medical history of Acute pharyngitis, unspecified (2014), Cough, unspecified (2014), Other abnormal and inconclusive findings on diagnostic imaging of breast (2015), Personal history of other benign neoplasm (2014), Personal history of other diseases of the respiratory system (2014), and Unspecified asthma, uncomplicated ([facility]) (2022).    Surgical History  She has a past surgical history that includes Laparoscopy diagnostic / biopsy / aspiration / lysis (2014); Tubal ligation (2014); Other surgical history (2014); Other surgical history (2014); Other surgical history (2014); Appendectomy (2014);  section, classic (2014); Cardiac electrophysiology procedure (N/A, 2025); and Cardiac electrophysiology procedure (Right, 3/28/2025).     Social History  She reports that she has never smoked. She has never used smokeless tobacco. She reports that she does not currently use alcohol. She reports that she does not use drugs.    Family History  Family History[1]     Allergies  Macrolide antibiotics, Penicillins, Sulfa (sulfonamide antibiotics), Adhesive tape-silicones, Bee venom protein (honey bee), Ciprofloxacin, Erythromycin base, and Penicillin    Review of Symptoms:   Constitutional: Negative for chills, diaphoresis or fever  HENT: Negative for neck swelling  Eyes:.  Negative for eye pain  Respiratory:.  Negative for cough, shortness of breath or wheezing    Cardiovascular:.  Negative for chest pain or palpitations  Gastrointestinal:.  Negative for abdominal pain, nausea and vomiting  Genitourinary:.  Negative for urgency  Musculoskeletal:  Positive for back pain. Positive for joint  pain. Denies falls within the past 3 months.  Skin: Negative for wounds or itching   Neurological: Negative for dizziness, seizures, loss of consciousness and weakness  Endo/Heme/Allergies: Does not bruise/bleed easily  Psychiatric/Behavioral: Negative for depression. The patient does not appear anxious.      Pre-sedation Evaluation  ASA class 2  Mallampati score 2     PHYSICAL EXAM  Vitals signs reviewed  Constitutional:       General: Not in acute distress     Appearance: Normal appearance. Not ill-appearing.  HENT:     Head: Normocephalic and atraumatic  Eyes:     Conjunctiva/sclera: Conjunctivae normal  Cardiovascular:     Rate and Rhythm: Normal rate and regular rhythm  Pulmonary:     Effort: No respiratory distress  Abdominal:     Palpations: Abdomen is soft  Musculoskeletal: ORTIZ  Skin:     General: Skin is warm and dry  Neurological:     General: No focal deficit present  Psychiatric:         Mood and Affect: Mood normal         Behavior: Behavior normal     Last Recorded Vitals  There were no vitals taken for this visit.    Relevant Results  Current Outpatient Medications   Medication Instructions    acetaminophen (TYLENOL) 650 mg, Every 4 hours PRN    albuterol 90 mcg/actuation inhaler 2 puffs, Every 4 hours PRN    albuterol 2.5 mg, Every 4 hours PRN    ascorbic acid (Vitamin C) 500 mg chewable tablet 1 tablet, Daily    atorvastatin (Lipitor) 20 mg tablet 1 tablet, Daily (0630)    atorvastatin (LIPITOR) 20 mg, oral, Daily    blood sugar diagnostic (Accu-Chek Guide test strips) strip TEST twice a day    Breo Ellipta 100-25 mcg/dose inhaler 1 puff, Daily    cholecalciferol (VITAMIN D-3) 50,000 Units, Every 14 days    cholecalciferol (VITAMIN D-3) 125 mcg, Daily    desonide (DesOwen) 0.05 % cream     diphenhydrAMINE (BENADRYL) 50 mg, Daily PRN    fluticasone (Flonase) 50 mcg/actuation nasal spray 4 sprays, 3 times daily    furosemide (LASIX) 20 mg, oral, 2 times daily    gabapentin (Neurontin) 300 mg capsule 2  caps tid    gabapentin (NEURONTIN) 300 mg, oral, 3 times daily    gentamicin (Garamycin) 0.1 % cream     ipratropium-albuteroL (Duo-Neb) 0.5-2.5 mg/3 mL nebulizer solution 3 mL, Every 6 hours    latanoprost (Xalatan) 0.005 % ophthalmic solution 1 drop, Nightly    lidocaine 4 % patch 1 patch, transdermal, Every 24 hours, Remove & discard patch within 12 hours or as directed by MD.    losartan (COZAAR) 100 mg, oral, Daily    magnesium gluconate (Magonate) 27.5 mg magne- sium (500 mg) tablet 200 mg, 2 times daily    meloxicam (MOBIC) 15 mg, oral, Daily    metFORMIN (GLUCOPHAGE) 1,000 mg, 2 times daily (morning and late afternoon)    methocarbamol (ROBAXIN) 500 mg, oral, 4 times daily PRN    metoprolol succinate XL (TOPROL-XL) 100 mg, oral, Daily, Do not crush or chew.    montelukast (SINGULAIR) 10 mg, oral, Nightly, ----MUST MAKE APPT    Mounjaro 15 mg, subcutaneous, Weekly    multivitamin tablet 1 tablet, Daily    multivitamin with minerals tablet 1 tablet, Daily    naproxen (NAPROSYN) 500 mg, oral, Every 12 hours    omega 3-dha-epa-fish oil (Fish OiL) 1,000 (120-180) mg capsule 1 capsule, Daily    potassium chloride CR 20 mEq ER tablet 20 mEq, oral, Daily, ----MUST MAKE APPT FOR FURTHER REFILLS    traMADol (Ultram) 50 mg tablet     travoprost (Travatan Z) 0.004 % drops ophthalmic solution INSTILL 1 DROP IN BOTH EYES EVERY NIGHT         MR lumbar spine wo IV contrast 06/04/2025    Narrative  Interpreted By:  Roc Ochoa,  STUDY:  MR LUMBAR SPINE WO IV CONTRAST;  6/4/2025 9:11 am    INDICATION:  Signs/Symptoms:pain.    COMPARISON:  There are no available comparison MRI studies of the lumbar spine.    ACCESSION NUMBER(S):  [id number]    ORDERING CLINICIAN:  NATASHA QUILES    TECHNIQUE:  Sagittal STIR, sagittal T2, sagittal T1, axial T2, axial T1 weighted  MRI images of the lumbar spine were obtained without intravenous  contrast administration.    FINDINGS:  The coronal  images demonstrate a dextrocurvature  of the lumbar  spine.    There is 5 mm of anterolisthesis of L4 on L5.    There are degenerative signal changes along endplates within lower  lumbar region.    The visualized spinal cord demonstrates no signal abnormality within  it. The conus medullaris terminates at the inferior L2 level.    There is diminished disc signal at the L4/5 and L5/S1 levels  compatible with degenerative disc disease.    At the L5/S1 level,  there is mild posterior osteophytic spurring and  posterior disc bulge with superimposed right foraminal/far lateral  disc herniation impressing upon the exiting right L5 nerve. There are  mild degenerative facet changes. There is moderate right-sided neural  foraminal narrowing. There is no significant narrowing of the thecal  sac within the spinal canal or left-sided neural foraminal narrowing.  Degenerative facet changes without significant narrowing of the  thecal sac within the spinal canal.    At the L4/L5 level,  there is 5 mm of anterolisthesis of L4 on L5  along with hypertrophic degenerative facet changes and ligamentum  flavum hypertrophy contributing to mild-to-moderate spinal canal  narrowing. There is mild encroachment upon the neural foramen  bilaterally.    At the L3/L4 level,  there are hypertrophic degenerative facet  changes, ligamentum flavum hypertrophy and a minimal posterior disc  bulge contributing to mild spinal canal narrowing. There is no  significant neural foraminal narrowing.    At the L2/L3 level,  there are mild degenerative facet changes  without significant spinal canal or neural foraminal narrowing.    At the L1/L2 level,  there is mild posterior osteophytic spurring and  posterior disc bulge asymmetrically more pronounced to the left of  midline along with degenerative facet changes contributing to mild  encroachment upon the spinal canal. There is no significant neural  foraminal narrowing.    At the T12/L1 level,  there are degenerative facet changes  without  significant spinal canal or neural foraminal narrowing.    Impression  The coronal  images demonstrate a dextrocurvature of the lumbar  spine.    There is 5 mm of anterolisthesis of L4 on L5.    There is multilevel spondylosis with varying degrees of spinal canal  and neural foraminal narrowing as described above.    MACRO:  None.    Signed by: Roc Ochoa 6/5/2025 4:40 PM  Dictation workstation:   [id number]      XR hip right with pelvis when performed 2 or 3 views 04/24/2025    Narrative  Interpreted By:  Barb Henderson,  STUDY:  Single view pelvis.  Right hip, two views.    INDICATION:  Signs/Symptoms:right hip pain.    COMPARISON:  02/12/2025.    ACCESSION NUMBER(S):  [id number]    ORDERING CLINICIAN:  SHAUN BENSON    FINDINGS:  No acute fracture or malalignment.  Moderate bilateral hip osteoarthrosis with joint space loss,  osteophytes, and subchondral cyst formation. At least moderate  appearing lower lumbar spondylosis and facet arthropathy. Soft  tissues are within normal limits.    Impression  1. As above.    MACRO:  None.    Signed by: Barb Henderson 4/26/2025 7:25 AM  Dictation workstation:   [id number]       ASSESSMENT/PLAN  Smitha Frank is a 55 y.o. female here for an Interlaminar Epidural Steroid Injection w/ Fluoroscopic Guidance    Patient denies any recent antibiotic use or infections, denies any blood thinner use, and denies contrast or local anesthetic allergies     Risks, benefits, alternatives discussed. All questions answered to the best of my ability. Patient agrees to proceed.      Our plan is as follows:  - Proceed with aforementioned procedure       Nella Upton DO  Chronic Pain Management Fellow         [1]   Family History  Problem Relation Name Age of Onset    Breast cancer Mother      Lung cancer Mother      Sleep apnea Mother      Heart disease Father      Hypertension Father      Diabetes Father      Sleep apnea Father      Breast cancer Sister       Multiple sclerosis Other Family Hx

## 2025-07-07 NOTE — DISCHARGE INSTRUCTIONS
DISCHARGE INSTRUCTIONS FOR INJECTIONS     After most injections, it is recommended that you relax and limit your activity for the remainder of the day unless you have been told otherwise by your pain physician.  You should not drive a car, operate machinery, or make important legal decisions unless otherwise directed by your pain physician.  You may resume your normal activity, including exercise, tomorrow.      Keep a written pain diary of how much pain relief you experienced following the injection procedure and the length of time of pain relief you experienced pain relief. Following diagnostic injections like medial branch nerve blocks, sacroiliac joint blocks, stellate ganglion injections and other blocks, it is very important you record the specific amount of pain relief you experienced immediately after the injectionand how long it lasted. Your doctor will ask you for this information at your follow up visit.     For all injections, please keep the injection site dry and inspect the site for a couple of days. You may remove the Band-Aid the day of the injection at any time.     Some discomfort, bruising or slight swelling may occur at the injection site. This is not abnormal if it occurs.  If needed you may:    -Take over the counter medication such as Tylenol or Motrin.   -Apply an ice pack for 30 minutes, 2 to 3 times a day for the first 24 hours.     You may shower today; no soaking baths, hot tubs, whirlpools or swimming pools for two days.      If you are given steroids in your injection, it may take 3-5 days for the steroid medication to take effect. You may notice a worsening of your symptoms for 1-2 days after the injection. This is not abnormal.  You may use acetaminophen, ibuprofen, or prescription medication that your doctor may have prescribed for you if you need to do so.     A few common side effects of steroids include facial flushing, sweating, restlessness, irritability,difficulty sleeping,  increase in blood sugar, and increased blood pressure. If you have diabetes, please monitor your blood sugar at least once a day for at least 5 days. If you have poorly controlled high blood pressure, monitoryour blood pressure for at least 2 days and contact your primary care physician if these numbers are unusually high for you.      If you take aspirin or non-steroidal anti-inflammatory drugs (examples are Motrin, Advil, ibuprofen, Naprosyn, Voltaren, Relafen, etc.) you may restart these this evening, but stop taking it 3 days before your next appointment, unless instructed otherwiseby your physician.      You do not need to discontinue non-aspirin-containing pain medications prior to an injection (examples: Celebrex, tramadol, hydrocodone and acetaminophen).      If you take a blood thinning medication (Coumadin, Lovenox, Fragmin,Ticlid, Plavix, Pradaxa, etc.), please discuss this with your primary care physician/cardiologist and your pain physician. These medications MUST be discontinued before you can have an injection safely, without the risk of uncontrolled bleeding. If these medications are not discontinued for an appropriate period of time, you will not be able to receivean injection. Please adhere to instructions given to you about when to restart your blood thinning medication. If you have any questions please reach out to our team.    If you are taking Coumadin, please have your INR checked the morning of your procedure and bring the result to your appointment unless otherwise instructed. If your INR is over 1.2, your injection may need to be rescheduled to avoid uncontrolled bleeding from the needle placement.     Call [facility]  and ask for Pain Management at [phone] between 8am-4pm Monday - Friday if you are experiencing the following:    If you received an epidural or spinal injection:    -Headache that doesnot go away with medicine, is worse when sitting or standing up, and is greatly  relieved upon lying down.   -Severe pain worse than or different than your baseline pain.   -Chills or fever (101º F or greater).   -Drainage or signs of infection at the injection site     Go directly to the Emergency Department if you are experiencing the following and received an epidural or spinal injection:   -Abrupt weakness or progressive weakness in your legs that starts after you leave the clinic.   -Abrupt severe or worsening numbness in your legs.   -Inability to urinate after the injection or loss of bowel or bladder control without the urge to defecate or urinate.     If you have a clinical question that cannot wait until your next appointment, please call [phone] between 8am-4pm Monday - Friday or send a [facility] [employer] [facility] message. We do our best to return all non-emergency messages within 24 hours, Monday - Friday. A nurse or physician will return your message. You may also try calling and they will do their best to answer your question(s):   - Dr. Royer Peck's nurse ([phone])    If you need to cancel an appointment, please call the scheduling staff at [phone] during normal business hours or leave a message at least 24 hours in advance.     If you are going to be sedated for your next procedure, you MUST have responsible adult who can legally drive accompany you home. You cannot eat or drink for at least eight hours prior to the planned procedure if you are going to receive sedation. You may take your non-blood thinning medications with a small sip of water.

## 2025-07-11 ENCOUNTER — TREATMENT (OUTPATIENT)
Dept: PHYSICAL THERAPY | Facility: CLINIC | Age: 55
End: 2025-07-11
Payer: COMMERCIAL

## 2025-07-11 DIAGNOSIS — M54.50 LOWER BACK PAIN: ICD-10-CM

## 2025-07-11 PROCEDURE — 97110 THERAPEUTIC EXERCISES: CPT | Mod: GP | Performed by: PHYSICAL THERAPIST

## 2025-07-11 NOTE — PROGRESS NOTES
Physical Therapy    Physical Therapy Treatment    Patient Name: Smitha Frank  MRN: [id number]  Encounter Date: 7/11/2025  Visit 7    Payor: [employer] / Plan: [employer] / Product Type: *No Product type* /   NO AUTH,  250 DED-MET, 80% COVERAGE, 30V PT, 1500 OOP-MET,   Time Calculation  Start Time: 1304  Stop Time: 1342  Time Calculation (min): 38 min        PT Therapeutic Procedures Time Entry  Therapeutic Exercise Time Entry: 38        Primary Dx: Low back pain    Current Problem  Problem List Items Addressed This Visit    None  Visit Diagnoses         Codes      Lower back pain     M54.50          Assessment:   Patient presents to the clinic with radicular pain to R LE.  Patient progressed to standing exercises this date to improve her LE strength.  Patient had slight pain with hip adduction so it was stopped immediately.  Patient denied pain at the end of the session.   Patient will benefit from skilled PT to increase her core and LE strength, improve her hip flexibility and decrease her radicular pain.      Plan:     OP PT Plan  Treatment/Interventions: Education/ Instruction, Hot pack, Manual therapy, Neuromuscular re-education, Therapeutic activities, Therapeutic exercises  PT Plan: Skilled PT  PT Frequency: 1 time per week  Duration: 6 visits + 6 additional visits  Onset Date: 02/12/25  Certification Period Start Date: 05/12/25  Certification Period End Date: 08/10/25  Rehab Potential: Good  Plan of Care Agreement: Patient     General     Referred by: Mario Cramer    Subjective   Patient had an injection on the 7/7.  Patient rates pain at 3-4/10 currently on the R side of the low back and into the buttock.  Patient notes the injection seemed to help some.  Patient notes that she still has some pain, but the severity is less.  Patient notes that she had a slight reaction to the shot and her face became puffy.  Patient denies any symptoms today.      Precautions: none       "Imaging: There is 5 mm of anterolisthesis of L4 on L5. There is multilevel spondylosis with varying degrees of spinal canal  and neural foraminal narrowing    Objective:  Not Assessed     Treatment:  Therapeutic Exercises x 38 minutes   Standing hip ext 2 x 10 each   Standing hip abduction 2 x 10 ea  Standing hamstring curl 2 x 10 ea   Glute Sets in hooklying 3 x 10   Hip IR/ER in prone 2 x 10 ea  Heel Raise 3 x 10   Hip Abduction isometric with BTB 3 x 10   Stepper x 6 minutes   Hip Adduction isometric - stopped due to pain      Not Completed  Prone on elbows x 1 min  Prone hamstring curls 2 x 10 ea  Prone Heel squeeze 2 x 10     HEP:  Piriformis stretch 3 x 30\"  PPT 2 x 10    Access Code: [email or web address]  URL: [email or web address]  Date: 07/11/2025  Prepared by: Shanique Hawkins    Exercises  - Standing Hip Abduction with Counter Support  - 1 x daily - 7 x weekly - 2 sets - 10 reps  - Standing Hip Extension with Counter Support  - 1 x daily - 7 x weekly - 2 sets - 10 reps    Goals:   Active       PT Problem       HEP (Progressing)       Start:  05/12/25    Expected End:  07/14/25       Patient will be independent with HEP and self management of condition      Goal Note       Completing some exercises at home              ROM (Progressing)       Start:  05/12/25    Expected End:  07/14/25       Patient will increase lumbar ROM to WNL and without radicular pain to functionally roll over in bed and sleep throughout the night.       Goal Note       Continued pain with flexion              Strength (Progressing)       Start:  05/12/25    Expected End:  07/14/25       Patient will increase R hip strength to 5/5 on MMT with 0/10 pain to functionally improve hip and low back stability with standing and walking at work.       Goal Note       Improving, see objective              Outcomes (Not Progressing)       Start:  05/12/25    Expected End:  07/14/25       Patient will improve STEWART score to <20% to " "demonstrate an improvement in her pain with daily activities.          Function (Not Progressing)       Start:  05/12/25    Expected End:  07/14/25       Patient will complete 8\" step ups x 10 with 0/10 pain in the B hips to improve ability to complete stair navigation at work and home.        Goal Note       Not completed at this time due to high pain levels                "

## 2025-08-01 ENCOUNTER — TREATMENT (OUTPATIENT)
Dept: PHYSICAL THERAPY | Facility: CLINIC | Age: 55
End: 2025-08-01
Payer: COMMERCIAL

## 2025-08-01 DIAGNOSIS — M54.50 LOWER BACK PAIN: Primary | ICD-10-CM

## 2025-08-01 DIAGNOSIS — M54.16 LUMBAR RADICULOPATHY: ICD-10-CM

## 2025-08-01 PROCEDURE — 97110 THERAPEUTIC EXERCISES: CPT | Mod: GP | Performed by: PHYSICAL THERAPIST

## 2025-08-01 PROCEDURE — 97530 THERAPEUTIC ACTIVITIES: CPT | Mod: GP | Performed by: PHYSICAL THERAPIST

## 2025-08-01 NOTE — PROGRESS NOTES
Physical Therapy    Physical Therapy Treatment    Patient Name: Smitha Frank  MRN: [id number]  Encounter Date: 8/1/2025  Visit 8    Payor: [employer] / Plan: [employer] / Product Type: *No Product type* /   NO AUTH,  250 DED-MET, 80% COVERAGE, 30V PT, 1500 OOP-MET,   Time Calculation  Start Time: 1513  Stop Time: 1554  Time Calculation (min): 41 min        PT Therapeutic Procedures Time Entry  Therapeutic Exercise Time Entry: 32  Therapeutic Activity Time Entry: 9        Primary Dx: Low back pain    Current Problem  Problem List Items Addressed This Visit    None  Visit Diagnoses         Codes      Lower back pain    -  Primary M54.50      Lumbar radiculopathy     M54.16          Assessment:   Patient presents to the clinic with radicular pain to R LE.  Patient was able to progress to more strengthening exercises this date due to her back/leg feeling better.  Patient denied pain with the exercises.  Patient will benefit from skilled PT to increase her core and LE strength, improve her hip flexibility and decrease her radicular pain.      Plan:     OP PT Plan  Treatment/Interventions: Education/ Instruction, Hot pack, Manual therapy, Neuromuscular re-education, Therapeutic activities, Therapeutic exercises  PT Plan: Skilled PT  PT Frequency: 1 time per week  Duration: 6 visits + 6 additional visits  Onset Date: 02/12/25  Certification Period Start Date: 05/12/25  Certification Period End Date: 08/10/25  Rehab Potential: Good  Plan of Care Agreement: Patient     General     Referred by: Mario Cramer    Subjective   Patient reports 1/10 pain.  Patient notes that her pain increases with a lot of walking, when it rains and at night.  Patient notes occasional pain down the leg.      Precautions: none      Imaging: There is 5 mm of anterolisthesis of L4 on L5. There is multilevel spondylosis with varying degrees of spinal canal  and neural foraminal narrowing    Objective:  Not Assessed  "    Treatment:  Therapeutic Exercises x 38 minutes   Standing hip ext 2 x 10 each   Standing hip abduction 2 x 10 ea  Standing hamstring curl 3 x 10 ea   Low Bridge 3 x 10   Heel Raise 3 x 10   Stepper x 5 minutes   March Walk 2 laps   Side stepping 2 laps    Therapeutic Activities x 9 minutes  - 6\" step ups 2 x 10   - Sit to stands with 2\" elevated seat 2 x 10       Not Completed  Prone on elbows x 1 min  Prone hamstring curls 2 x 10 ea  Prone Heel squeeze 2 x 10     HEP:     Access Code: [email or web address]  URL: [email or web address]  Date: 07/11/2025  Prepared by: Shanique Hawkins    Exercises  - Standing Hip Abduction with Counter Support  - 1 x daily - 7 x weekly - 2 sets - 10 reps  - Standing Hip Extension with Counter Support  - 1 x daily - 7 x weekly - 2 sets - 10 reps    Goals:   Active       PT Problem       HEP (Progressing)       Start:  05/12/25    Expected End:  07/14/25       Patient will be independent with HEP and self management of condition      Goal Note       Completing some exercises at home              ROM (Progressing)       Start:  05/12/25    Expected End:  07/14/25       Patient will increase lumbar ROM to WNL and without radicular pain to functionally roll over in bed and sleep throughout the night.       Goal Note       Continued pain with flexion              Strength (Progressing)       Start:  05/12/25    Expected End:  07/14/25       Patient will increase R hip strength to 5/5 on MMT with 0/10 pain to functionally improve hip and low back stability with standing and walking at work.       Goal Note       Improving, see objective              Outcomes (Not Progressing)       Start:  05/12/25    Expected End:  07/14/25       Patient will improve STEWART score to <20% to demonstrate an improvement in her pain with daily activities.          Function (Not Progressing)       Start:  05/12/25    Expected End:  07/14/25       Patient will complete 8\" step ups x 10 with 0/10 pain in " the B hips to improve ability to complete stair navigation at work and home.        Goal Note       Not completed at this time due to high pain levels

## 2025-08-08 ENCOUNTER — TREATMENT (OUTPATIENT)
Dept: PHYSICAL THERAPY | Facility: CLINIC | Age: 55
End: 2025-08-08
Payer: COMMERCIAL

## 2025-08-08 DIAGNOSIS — M54.50 LOWER BACK PAIN: Primary | ICD-10-CM

## 2025-08-08 DIAGNOSIS — M54.16 LUMBAR RADICULOPATHY: ICD-10-CM

## 2025-08-08 PROCEDURE — 97110 THERAPEUTIC EXERCISES: CPT | Mod: GP | Performed by: PHYSICAL THERAPIST

## 2025-08-08 PROCEDURE — 97530 THERAPEUTIC ACTIVITIES: CPT | Mod: GP | Performed by: PHYSICAL THERAPIST

## 2025-08-08 NOTE — PROGRESS NOTES
Physical Therapy    Physical Therapy Treatment    Patient Name: Smitha Frank  MRN: [id number]  Encounter Date: 8/8/2025  Visit 9    Payor: [employer] / Plan: [employer] / Product Type: *No Product type* /   NO AUTH,  250 DED-MET, 80% COVERAGE, 30V PT, 1500 OOP-MET,   Time Calculation  Start Time: 1305  Stop Time: 1343  Time Calculation (min): 38 min        PT Therapeutic Procedures Time Entry  Therapeutic Exercise Time Entry: 27  Neuromuscular Re-Education Time Entry: 3  Therapeutic Activity Time Entry: 8        Primary Dx: Low back pain    Current Problem  Problem List Items Addressed This Visit    None  Visit Diagnoses         Codes      Lower back pain    -  Primary M54.50      Lumbar radiculopathy     M54.16            Assessment:   Patient presents to the clinic with radicular pain to R LE.  Patient progressed with strengthening exercises this date, but was challenged due to continued weakness.  Patient required cues with sit to stands to use her lower extremities and prevent low back involvement.  Patient will benefit from skilled PT to increase her core and LE strength, improve her hip flexibility and decrease her radicular pain.      Plan:     OP PT Plan  Treatment/Interventions: Education/ Instruction, Hot pack, Manual therapy, Neuromuscular re-education, Therapeutic activities, Therapeutic exercises  PT Plan: Skilled PT  PT Frequency: 1 time per week  Duration: 6 visits + 6 additional visits  Onset Date: 02/12/25  Certification Period Start Date: 05/12/25  Certification Period End Date: 08/10/25  Rehab Potential: Good  Plan of Care Agreement: Patient     General     Referred by: Mario Cramer    Subjective   Patient reports 1/10 pain in the back.  Patient denies pain in the leg. Patient notes she is not quite back to normal, but she is getting better.      Precautions: none      Imaging: There is 5 mm of anterolisthesis of L4 on L5. There is multilevel spondylosis with  "varying degrees of spinal canal  and neural foraminal narrowing    Objective:  Not Assessed     Treatment:  Therapeutic Exercises x 27 minutes   TA + SLR 2 x 10  Clamshells 2 x 10   LAQ 2# 3 x 10   Standing hip ext 2 x 10 each with 2#  Standing hip abduction 2 x 10 ea with 2#  Standing hamstring curl 3 x 10 ea with 2#  Low Bridge 3 x 10   Stepper x 5 minutes, resistance 2     Neuromuscular Reeducation x 3 minutes  - SLS 2 x 30\"     Therapeutic Activities x 8 minutes  - 6\" step ups 2 x 10   - Sit to stands with regular seat 2 x 10       Not Completed  Prone on elbows x 1 min  Prone hamstring curls 2 x 10 ea  Prone Heel squeeze 2 x 10     HEP:     Access Code: [email or web address]  URL: [email or web address]  Date: 07/11/2025  Prepared by: Shanique Hawkins    Exercises  - Standing Hip Abduction with Counter Support  - 1 x daily - 7 x weekly - 2 sets - 10 reps  - Standing Hip Extension with Counter Support  - 1 x daily - 7 x weekly - 2 sets - 10 reps    Goals:   Active       PT Problem       HEP (Progressing)       Start:  05/12/25    Expected End:  07/14/25       Patient will be independent with HEP and self management of condition      Goal Note       Completing some exercises at home              ROM (Progressing)       Start:  05/12/25    Expected End:  07/14/25       Patient will increase lumbar ROM to WNL and without radicular pain to functionally roll over in bed and sleep throughout the night.       Goal Note       Continued pain with flexion              Strength (Progressing)       Start:  05/12/25    Expected End:  07/14/25       Patient will increase R hip strength to 5/5 on MMT with 0/10 pain to functionally improve hip and low back stability with standing and walking at work.       Goal Note       Improving, see objective              Outcomes (Not Progressing)       Start:  05/12/25    Expected End:  07/14/25       Patient will improve STEWART score to <20% to demonstrate an improvement in her pain " "with daily activities.          Function (Not Progressing)       Start:  05/12/25    Expected End:  07/14/25       Patient will complete 8\" step ups x 10 with 0/10 pain in the B hips to improve ability to complete stair navigation at work and home.        Goal Note       Not completed at this time due to high pain levels                "

## 2025-08-10 DIAGNOSIS — M54.16 LUMBAR RADICULOPATHY: ICD-10-CM

## 2025-08-11 RX ORDER — GABAPENTIN 300 MG/1
300 CAPSULE ORAL 3 TIMES DAILY
Qty: 90 CAPSULE | Refills: 2 | Status: SHIPPED | OUTPATIENT
Start: 2025-08-11

## 2025-08-25 ENCOUNTER — OFFICE VISIT (OUTPATIENT)
Facility: HOSPITAL | Age: 55
End: 2025-08-25
Payer: COMMERCIAL

## 2025-08-25 VITALS
OXYGEN SATURATION: 96 % | DIASTOLIC BLOOD PRESSURE: 72 MMHG | HEIGHT: 65 IN | SYSTOLIC BLOOD PRESSURE: 107 MMHG | WEIGHT: 274 LBS | BODY MASS INDEX: 45.65 KG/M2 | HEART RATE: 75 BPM | TEMPERATURE: 97 F

## 2025-08-25 DIAGNOSIS — E66.813 OBESITY, CLASS III, BMI 40-49.9 (MORBID OBESITY): Primary | ICD-10-CM

## 2025-08-25 PROCEDURE — 3008F BODY MASS INDEX DOCD: CPT | Performed by: FAMILY MEDICINE

## 2025-08-25 PROCEDURE — 3078F DIAST BP <80 MM HG: CPT | Performed by: FAMILY MEDICINE

## 2025-08-25 PROCEDURE — 99212 OFFICE O/P EST SF 10 MIN: CPT

## 2025-08-25 PROCEDURE — 99213 OFFICE O/P EST LOW 20 MIN: CPT | Performed by: FAMILY MEDICINE

## 2025-08-25 PROCEDURE — 4010F ACE/ARB THERAPY RXD/TAKEN: CPT | Performed by: FAMILY MEDICINE

## 2025-08-25 PROCEDURE — 1036F TOBACCO NON-USER: CPT | Performed by: FAMILY MEDICINE

## 2025-08-25 PROCEDURE — 3074F SYST BP LT 130 MM HG: CPT | Performed by: FAMILY MEDICINE

## 2025-08-25 ASSESSMENT — ENCOUNTER SYMPTOMS: CONSTITUTIONAL NEGATIVE: 1

## 2025-08-25 ASSESSMENT — PAIN SCALES - GENERAL: PAINLEVEL_OUTOF10: 0-NO PAIN

## (undated) DEVICE — CATHETER, THERMOCOOL SMART TOUCH, SF, D-F CURVE

## (undated) DEVICE — PATCHES, EXTERNAL REFERENCE, CARTO3

## (undated) DEVICE — CATHETHER, CS, BI-DIRECTIONAL, 10 POLES, D-F TYPE

## (undated) DEVICE — TUBING SET, IRRIGATION, SMARTABLATE

## (undated) DEVICE — PAD, ELECTRODE DEFIB PADPRO ADULT STRL W/ADAPTER

## (undated) DEVICE — INTRODUCER, HEMOSTASIS, STR/J .038 IN, 8.5FR 12CM

## (undated) DEVICE — INTERFACE CABLE, EXISITING DX CATHETERS, BLUE PORT (REPROCESS)

## (undated) DEVICE — INTRODUCER, SHEATH, FAST-CATH, 8FR X 12CM, C-LOCK

## (undated) DEVICE — INTRODUCER, HEMO, FAST-CATH, 8.5 FR X 63 CM, SR0 038GW, G2

## (undated) DEVICE — CABLE, 34 HYP, 34 LEMO, 10FT, SMART TOUCH (REPROCESS)